# Patient Record
Sex: FEMALE
[De-identification: names, ages, dates, MRNs, and addresses within clinical notes are randomized per-mention and may not be internally consistent; named-entity substitution may affect disease eponyms.]

---

## 2022-02-07 ENCOUNTER — APPOINTMENT (OUTPATIENT)
Age: 67
End: 2022-02-07

## 2022-05-19 DIAGNOSIS — M25.562 PAIN IN LEFT KNEE: ICD-10-CM

## 2022-05-20 ENCOUNTER — NON-APPOINTMENT (OUTPATIENT)
Age: 67
End: 2022-05-20

## 2022-05-20 ENCOUNTER — APPOINTMENT (OUTPATIENT)
Dept: ORTHOPEDIC SURGERY | Facility: CLINIC | Age: 67
End: 2022-05-20
Payer: COMMERCIAL

## 2022-05-20 VITALS — HEIGHT: 62 IN | BODY MASS INDEX: 40.48 KG/M2 | WEIGHT: 220 LBS

## 2022-05-20 DIAGNOSIS — M21.162 VARUS DEFORMITY, NOT ELSEWHERE CLASSIFIED, LEFT KNEE: ICD-10-CM

## 2022-05-20 PROCEDURE — 99204 OFFICE O/P NEW MOD 45 MIN: CPT

## 2022-05-20 PROCEDURE — 73564 X-RAY EXAM KNEE 4 OR MORE: CPT | Mod: LT

## 2022-05-20 NOTE — ADDENDUM
[FreeTextEntry1] : This note was written by Elle Burkett on 05/20/2022 acting as scribe for Dr. Mekhi Sanchez M.D.\par \par I, Dr. Mekhi Sanchez, have read and attest that all the information, medical decision making and discharge instructions within are true and accurate.

## 2022-05-20 NOTE — PHYSICAL EXAM
[de-identified] : General appearance: well nourished and hydrated, pleasant, alert and oriented x 3, cooperative.\par HEENT: Normocephalic, EOM intact, Nasal septum midline, Oral cavity clear, External auditory canal clear.\par Cardiovascular: no apparent abnormalities, bilateral lower leg edema, no varicosities, pedal pulses are palpable.\par Lymphatics Lymph nodes: none palpated, Lymphedema: not present.\par Neurologic: sensation is normal, no muscle weakness in upper or lower extremities, patella tendon reflexes intact .\par Dermatologic no apparent skin lesions, moist, warm, no rash.\par Spine:cervical spine appears normal and moves freely, thoracic spine appears normal and moves freely, lumbar lordosis\par Gait: nonantalgic, lateral thrust\par \par Left knee\par Inspection: trace effusion, no erythema.\par Wounds: none.\par Alignment: 10 degrees varus\par Palpation: no specific tenderness on palpation.\par ROM active (in degrees): 0-90 with pain and crepitus\par Ligamentous laxity: all ligaments appear stable,, negative ant. drawer test, negative post. drawer test, stable to varus stress test, stable to valgus stress test. negative Lachman's test, negative pivot shift test\par Meniscal Test: negative McMurrays, negative Luz.\par Patellofemoral Alignment Test: Q angle-, normal.\par Muscle Test: good quad strength.\par \par Right knee\par Inspection: no effusion or erythema.\par Wounds: none.\par Alignment: varus\par Palpation: no specific tenderness on palpation.\par ROM active (in degrees): 0-90\par Ligamentous laxity: all ligaments appear stable,, negative ant. drawer test, negative post. drawer test, stable to varus stress test, stable to valgus stress test. negative Lachman's test, negative pivot shift test\par Meniscal Test: negative McMurrays, negative Luz.\par Patellofemoral Alignment Test: Q angle-, normal.\par Muscle Test: good quad strength.\par \par Left hip\par Inspection: No swelling or ecchymosis.\par Wounds: none.\par Palpation: non-tender.\par Stability: no instability.\par Strength: 5/5 all motor groups.\par ROM: no pain with FROM.\par Leg length: equal.\par \par Right hip\par Inspection: No swelling or ecchymosis.\par Wounds: none.\par Palpation: non-tender.\par Stability: no instability.\par Strength: 5/5 all motor groups.\par ROM: no pain with FROM.\par Leg length: equal.\par \par Left ankle\par Inspection: no erythema noted, no swelling noted.\par Palpation: no pain on palpation .\par ROM: FROM without crepitus.\par Muscle strength: 5/5.\par Stability: no instability noted.\par \par Right ankle\par Inspection: no erythema noted, no swelling noted.\par ROM: FROM without crepitus.\par Palpation: no pain on palpation .\par Muscle strength: 5/5.\par Stability: no instability noted.\par \par Left foot\par Inspection: color, texture and turgor are normal.\par ROM: full range of motion of all joints without pain or crepitus.\par Palpation: no tenderness.\par Stability: no instability noted.\par \par Right foot\par Inspection: color, texture and turgor are normal.\par ROM: full range of motion of all joints without pain or crepitus.\par Palpation: no tenderness.\par Stability: no instability noted.\par \par Left shoulder\par Inspection: no muscle asymmetry, no atrophy.\par Palpation: no tenderness noted, ACJ non-tender.\par ROM: full active ROM, full passive ROM.\par Strength testing): anterior deltoid, supraspinatus, infraspinatus, subscapularis all 5/5.\par Stability test: ant. apprehension negative, post. apprehension negative, relocation test negative.\par Impingement Test: negative NEER.\par \par Right shoulder\par Inspection: no muscle asymmetry, no atrophy.\par Palpation: no tenderness noted, ACJ non-tender.\par ROM: full active ROM, full passive ROM.\par Strength testing): anterior deltoid, supraspinatus, infraspinatus, subscapularis all 5/5.\par Stability test: ant. apprehension negative, post. apprehension negative, relocation test negative.\par Impingement Test: negative NEER.\par Surgical Wounds: none.\par \par Left elbow\par Inspection: negative swelling.\par Wounds: none.\par Palpation: non-tender.\par ROM: full ROM.\par Strength: 5/5 all groups.\par Stability: no instability.\par Mass: none.\par \par Right elbow\par Inspection: negative swelling.\par Wounds: none.\par Palpation: non-tender.\par ROM: full ROM.\par Strength: 5/5 all groups.\par Stability: no instability.\par Mass: none.\par \par Left wrist\par Inspection: negative swelling.\par Wound: none.\par Palpation (bone): no tenderness.\par ROM: full ROM.\par Strength: full , good.\par \par Right wrist\par Inspection: negative swelling.\par Wound: none.\par Palpation (bone): no tenderness.\par ROM: full ROM.\par Strength: full , good.\par \par Left hand\par Inspection: no skin changes, normal appearance.\par Wounds: none.\par Strength: full , able to make full fist.\par Sensation: light touch intact all fingers and thumb.\par Vascular: good capillary refill < 3 seconds, all fingers and thumb.\par Mass: none.\par \par Right hand\par Inspection: no skin changes, normal appearance.\par Wounds: none.\par Strength: full , able to make full fist.\par Sensation: light touch intact all fingers and thumb.\par Vascular: good capillary refill < 3 seconds, all fingers and thumb.\par Mass: none. [de-identified] : Left knee xrays, standing AP/Lateral and Merchant films, and 45 degree PA standing view, taken at the office today shows varus deformity diffuse tricompartmental degenerative arthritis, medial joint space narrowing, significant marginal osteophytes, bone on bone with sclerosis, patellofemoral joint space narrowing, peripheral osteophytes, loose bodies Kellgren and Javier grade 4\par \par Right knee xray merchant view taken at the office today demonstrates joint space narrowing, marginal osteophytes, sclerosis consistent with severe patellofemoral arthritis.

## 2022-05-20 NOTE — HISTORY OF PRESENT ILLNESS
[de-identified] : SHIRLENE SORIANO is a 66 year old female who presents for initial evaluation of left knee pain for many years. Denies any injury. Pain is medial, anterior with associated clicking. Reports that she walks about 1 block and uses cane for ambulation. H/o Cortisone injection in Sept 2021 and PT in 2020 which provided minimal relief. Also takes Tylenol 500mg daily. PMHx - Afib and is on Xarelto.

## 2022-05-20 NOTE — DISCUSSION/SUMMARY
[de-identified] : Discussed at length the natural history of LEFT knee degenerative arthritis and reviewed non-operative and operative treatment. Due to the pain, failure of prior nonoperative treatment including injections, NSAIDs, and physiotherapy, and associated disability I recommend a LEFT total knee replacement. The risks, benefits, convalescence and alternatives were reviewed. Numerous questions were asked and answered. We did discuss implant choice and fixation, with shared decision making with the patient. Models were used as an educational tool. Surgery will be scheduled at a convenient time. \par \par Preop medical and cardiac clearance.\par \par Also discussed limited ROM and her weight. She will need to come off of Xarelto preoperatively.

## 2022-07-26 ENCOUNTER — OUTPATIENT (OUTPATIENT)
Dept: OUTPATIENT SERVICES | Facility: HOSPITAL | Age: 67
LOS: 1 days | Discharge: HOME | End: 2022-07-26

## 2022-07-26 VITALS
HEIGHT: 62 IN | OXYGEN SATURATION: 99 % | HEART RATE: 65 BPM | RESPIRATION RATE: 17 BRPM | WEIGHT: 216.93 LBS | TEMPERATURE: 97 F | DIASTOLIC BLOOD PRESSURE: 70 MMHG | SYSTOLIC BLOOD PRESSURE: 124 MMHG

## 2022-07-26 DIAGNOSIS — E78.5 HYPERLIPIDEMIA, UNSPECIFIED: ICD-10-CM

## 2022-07-26 DIAGNOSIS — I10 ESSENTIAL (PRIMARY) HYPERTENSION: ICD-10-CM

## 2022-07-26 DIAGNOSIS — Z01.818 ENCOUNTER FOR OTHER PREPROCEDURAL EXAMINATION: ICD-10-CM

## 2022-07-26 DIAGNOSIS — M17.12 UNILATERAL PRIMARY OSTEOARTHRITIS, LEFT KNEE: ICD-10-CM

## 2022-07-26 DIAGNOSIS — I48.91 UNSPECIFIED ATRIAL FIBRILLATION: ICD-10-CM

## 2022-07-26 LAB
ALBUMIN SERPL ELPH-MCNC: 3.7 G/DL — SIGNIFICANT CHANGE UP (ref 3.3–5)
ALP SERPL-CCNC: 134 U/L — HIGH (ref 40–120)
ALT FLD-CCNC: 18 U/L — SIGNIFICANT CHANGE UP (ref 12–78)
ANION GAP SERPL CALC-SCNC: 8 MMOL/L — SIGNIFICANT CHANGE UP (ref 5–17)
APPEARANCE UR: CLEAR — SIGNIFICANT CHANGE UP
APTT BLD: 39.4 SEC — HIGH (ref 27.5–35.5)
AST SERPL-CCNC: 15 U/L — SIGNIFICANT CHANGE UP (ref 15–37)
BASOPHILS # BLD AUTO: 0.02 K/UL — SIGNIFICANT CHANGE UP (ref 0–0.2)
BASOPHILS NFR BLD AUTO: 0.4 % — SIGNIFICANT CHANGE UP (ref 0–2)
BILIRUB SERPL-MCNC: 0.6 MG/DL — SIGNIFICANT CHANGE UP (ref 0.2–1.2)
BILIRUB UR-MCNC: NEGATIVE — SIGNIFICANT CHANGE UP
BLD GP AB SCN SERPL QL: SIGNIFICANT CHANGE UP
BUN SERPL-MCNC: 12 MG/DL — SIGNIFICANT CHANGE UP (ref 7–23)
CALCIUM SERPL-MCNC: 9.3 MG/DL — SIGNIFICANT CHANGE UP (ref 8.5–10.1)
CHLORIDE SERPL-SCNC: 108 MMOL/L — SIGNIFICANT CHANGE UP (ref 96–108)
CO2 SERPL-SCNC: 27 MMOL/L — SIGNIFICANT CHANGE UP (ref 22–31)
COLOR SPEC: YELLOW — SIGNIFICANT CHANGE UP
CREAT SERPL-MCNC: 0.65 MG/DL — SIGNIFICANT CHANGE UP (ref 0.5–1.3)
DIFF PNL FLD: ABNORMAL
EGFR: 98 ML/MIN/1.73M2 — SIGNIFICANT CHANGE UP
EOSINOPHIL # BLD AUTO: 0.07 K/UL — SIGNIFICANT CHANGE UP (ref 0–0.5)
EOSINOPHIL NFR BLD AUTO: 1.3 % — SIGNIFICANT CHANGE UP (ref 0–6)
GLUCOSE SERPL-MCNC: 83 MG/DL — SIGNIFICANT CHANGE UP (ref 70–99)
GLUCOSE UR QL: NEGATIVE MG/DL — SIGNIFICANT CHANGE UP
HCT VFR BLD CALC: 38.3 % — SIGNIFICANT CHANGE UP (ref 34.5–45)
HGB BLD-MCNC: 12.7 G/DL — SIGNIFICANT CHANGE UP (ref 11.5–15.5)
IMM GRANULOCYTES NFR BLD AUTO: 0.4 % — SIGNIFICANT CHANGE UP (ref 0–1.5)
INR BLD: 2.07 RATIO — HIGH (ref 0.88–1.16)
KETONES UR-MCNC: ABNORMAL
LEUKOCYTE ESTERASE UR-ACNC: NEGATIVE — SIGNIFICANT CHANGE UP
LYMPHOCYTES # BLD AUTO: 2.27 K/UL — SIGNIFICANT CHANGE UP (ref 1–3.3)
LYMPHOCYTES # BLD AUTO: 41 % — SIGNIFICANT CHANGE UP (ref 13–44)
MCHC RBC-ENTMCNC: 29.5 PG — SIGNIFICANT CHANGE UP (ref 27–34)
MCHC RBC-ENTMCNC: 33.2 G/DL — SIGNIFICANT CHANGE UP (ref 32–36)
MCV RBC AUTO: 88.9 FL — SIGNIFICANT CHANGE UP (ref 80–100)
MONOCYTES # BLD AUTO: 0.39 K/UL — SIGNIFICANT CHANGE UP (ref 0–0.9)
MONOCYTES NFR BLD AUTO: 7 % — SIGNIFICANT CHANGE UP (ref 2–14)
NEUTROPHILS # BLD AUTO: 2.77 K/UL — SIGNIFICANT CHANGE UP (ref 1.8–7.4)
NEUTROPHILS NFR BLD AUTO: 49.9 % — SIGNIFICANT CHANGE UP (ref 43–77)
NITRITE UR-MCNC: NEGATIVE — SIGNIFICANT CHANGE UP
NRBC # BLD: 0 /100 WBCS — SIGNIFICANT CHANGE UP (ref 0–0)
PH UR: 5 — SIGNIFICANT CHANGE UP (ref 5–8)
PLATELET # BLD AUTO: 234 K/UL — SIGNIFICANT CHANGE UP (ref 150–400)
POTASSIUM SERPL-MCNC: 3.1 MMOL/L — LOW (ref 3.5–5.3)
POTASSIUM SERPL-SCNC: 3.1 MMOL/L — LOW (ref 3.5–5.3)
PROT SERPL-MCNC: 7.9 GM/DL — SIGNIFICANT CHANGE UP (ref 6–8.3)
PROT UR-MCNC: 15 MG/DL
PROTHROM AB SERPL-ACNC: 24.8 SEC — HIGH (ref 10.5–13.4)
RBC # BLD: 4.31 M/UL — SIGNIFICANT CHANGE UP (ref 3.8–5.2)
RBC # FLD: 13.1 % — SIGNIFICANT CHANGE UP (ref 10.3–14.5)
RBC CASTS # UR COMP ASSIST: SIGNIFICANT CHANGE UP /HPF (ref 0–4)
SODIUM SERPL-SCNC: 143 MMOL/L — SIGNIFICANT CHANGE UP (ref 135–145)
SP GR SPEC: 1.02 — SIGNIFICANT CHANGE UP (ref 1.01–1.02)
UROBILINOGEN FLD QL: 1 MG/DL
WBC # BLD: 5.54 K/UL — SIGNIFICANT CHANGE UP (ref 3.8–10.5)
WBC # FLD AUTO: 5.54 K/UL — SIGNIFICANT CHANGE UP (ref 3.8–10.5)
WBC UR QL: NEGATIVE — SIGNIFICANT CHANGE UP

## 2022-07-26 PROCEDURE — 93010 ELECTROCARDIOGRAM REPORT: CPT

## 2022-07-26 NOTE — PHYSICAL THERAPY INITIAL EVALUATION ADULT - IMPAIRMENTS FOUND, PT EVAL
gait, locomotion, and balance/muscle strength/ROM aerobic capacity/endurance/gait, locomotion, and balance/muscle strength/ROM

## 2022-07-26 NOTE — PHYSICAL THERAPY INITIAL EVALUATION ADULT - LIVES WITH, PROFILE
Pt lives with son with TBI and has HHA for the son, states that she has friends that will be able to assist her after surgery. Pt lives in private house with 3 steps to enter with L-HR up. Once inside, there is a flight of 13 steps with R-HR to the second floor, where the bedroom and one full bathroom is. Per pt, her uncle will be installing B/L HR for the flight of stairs.

## 2022-07-26 NOTE — H&P PST ADULT - PROBLEM SELECTOR PLAN 2
labs - cbc,pt/ptt,bmp,t&s,nose cx,ekg  Medical and cardiac clearance required  preop 3 day Hibiclens instruction reviewed and given .instructed on if  nose cx positive use Mupirocin 5 days and checklist given  take routine meds DOS with sips of water. avoid NSAID and OTC supplements. verbalized understanding  information on proper nutrition , increase protein and better food choices provided in packet   ensure clear provided  pt aware covid swab is required 3-5 days prior to surgery  anesthesiologist to review PST labs, EKG, medical and cardiac clearance and optimization for surgery

## 2022-07-26 NOTE — PHYSICAL THERAPY INITIAL EVALUATION ADULT - ADDITIONAL COMMENTS
Full bathroom on second floor has tub-shower combination, no grab bars, retractable shower head, and comfort height toilet seat. Half bathroom on first floor with standard height toilet seat. Pt states both bathrooms are able to fit a 3:1 commode. Pt is currently independent with ambulation and all ADLs. She has own cane. Pt reports pain at rest is 0/10 and at worst is 10/10, that is aggravated with walking and alleviated with ice/heat. She states that she has taken Tylenol and Baclofen for the pain. Reports no allergies to medications. Not currently doing outpatient PT. Reports no recent falls or buckling. R-handed, wears glasses for distance, retired and currently drives.

## 2022-07-26 NOTE — H&P PST ADULT - ASSESSMENT
66 year old female with a past medical history of HTN, AfIb ( on Xarelto), and HLD c/o pain and limited ROM to left knee secondary to osteoarthritis  She is scheduled for a left total knee replacement on 2022.    CAPRINI SCORE [CLOT]    AGE RELATED RISK FACTORS                                                       MOBILITY RELATED FACTORS  [ ] Age 41-60 years                                            (1 Point)                  [ ] Bed rest                                                        (1 Point)  [x ] Age: 61-74 years                                           (2 Points)                 [ ] Plaster cast                                                   (2 Points)  [ ] Age= 75 years                                              (3 Points)                 [ ] Bed bound for more than 72 hours                 (2 Points)    DISEASE RELATED RISK FACTORS                                               GENDER SPECIFIC FACTORS  [ ] Edema in the lower extremities                       (1 Point)                  [ ] Pregnancy                                                     (1 Point)  [ ] Varicose veins                                               (1 Point)                  [ ] Post-partum < 6 weeks                                   (1 Point)             [x] BMI > 25 Kg/m2                                            (1 Point)                  [ ] Hormonal therapy  or oral contraception          (1 Point)                 [ ] Sepsis (in the previous month)                        (1 Point)                  [ ] History of pregnancy complications                 (1 point)  [ ] Pneumonia or serious lung disease                                               [ ] Unexplained or recurrent                     (1 Point)           (in the previous month)                               (1 Point)  [ ] Abnormal pulmonary function test                     (1 Point)                 SURGERY RELATED RISK FACTORS  [ ] Acute myocardial infarction                              (1 Point)                 [ ]  Section                                             (1 Point)  [ ] Congestive heart failure (in the previous month)  (1 Point)               [ ] Minor surgery                                                  (1 Point)   [ ] Inflammatory bowel disease                             (1 Point)                 [ ] Arthroscopic surgery                                        (2 Points)  [ ] Central venous access                                      (2 Points)                [ ] General surgery lasting more than 45 minutes   (2 Points)       [ ] Stroke (in the previous month)                          (5 Points)               [x ] Elective arthroplasty                                         (5 Points)                                                                                                                                               HEMATOLOGY RELATED FACTORS                                                 TRAUMA RELATED RISK FACTORS  [ ] Prior episodes of VTE                                     (3 Points)                [ ] Fracture of the hip, pelvis, or leg                       (5 Points)  [ ] Positive family history for VTE                         (3 Points)                 [ ] Acute spinal cord injury (in the previous month)  (5 Points)  [ ] Prothrombin 78734 A                                     (3 Points)                 [ ] Paralysis  (less than 1 month)                             (5 Points)  [ ] Factor V Leiden                                             (3 Points)                  [ ] Multiple Trauma within 1 month                        (5 Points)  [ ] Lupus anticoagulants                                     (3 Points)                                                           [ ] Anticardiolipin antibodies                               (3 Points)                                                       [ ] High homocysteine in the blood                      (3 Points)                                             [ ] Other congenital or acquired thrombophilia      (3 Points)                                                [ ] Heparin induced thrombocytopenia                  (3 Points)                                          Total Score [     8    Caprini Score 0 - 2:  Low Risk, No VTE Prophylaxis required for most patients, encourage ambulation  Caprini Score 3 - 6:  At Risk, pharmacologic VTE prophylaxis is indicated for most patients (in the absence of a contraindication)  Caprini Score Greater than or = 7:  High Risk, pharmacologic VTE prophylaxis is indicated for most patients (in the absence of a contraindication)    Caprini score indicates that the patient is high risk for VTE event ( score 6 or greater). Surgical patient's in this group will benefit from both pharmacologic prophylaxis and intermittent compression devices . Surgical team will determine the balance between VTE  risk and bleeding risk and other clinical considerations

## 2022-07-26 NOTE — PHYSICAL THERAPY INITIAL EVALUATION ADULT - 30 SECOND CHAIR STAND TEST, REHAB EVAL
30 second Sit to Stand Test: (reps: 14) Functional assessment of lower extremity strength and ability to maintain balance in standing, discriminates those with low and high levels of functional activity.

## 2022-07-26 NOTE — STUDENT SIGN OFF DOCUMENT - DOCUMENTS STUDENTS ARE SIGNED OFF ON
Physical Therapy Evaluation/Discharge Plan Plan of Care/Assessment and Intervention/Constant Observation

## 2022-07-26 NOTE — H&P PST ADULT - HISTORY OF PRESENT ILLNESS
66 year old female with a past medical history of HTN, AfIb ( on Xarelto), and HLD c/o pain and limited ROM to left knee secondary to osteoarthritis  She is scheduled for a left total knee replacement on 8/18/2022.    She denies fever cough, SOB, recent travels and sick contacts.    Goal: to walk without pain

## 2022-07-26 NOTE — PHYSICAL THERAPY INITIAL EVALUATION ADULT - PATIENT/FAMILY/SIGNIFICANT OTHER GOALS STATEMENT, PT EVAL
Pt states that she would like to move with no pain Pt states that she would like to ambulate with no pain.

## 2022-07-26 NOTE — PHYSICAL THERAPY INITIAL EVALUATION ADULT - GENERAL OBSERVATIONS, REHAB EVAL
Patient seen seated in recliner chair in [facility] waiting area with no apparent distress. Bilateral genu varum noted.

## 2022-07-26 NOTE — PHYSICAL THERAPY INITIAL EVALUATION ADULT - SINGLE LEG BALANCE TEST, REHAB EVAL
One Leg Stand Test (OLST): Right: 4sec Left: unable to perform Functional test to assess fall risk. Both gait and stair negotiation require components of OLST. Participants unable to perform the OLST for at least 5 seconds are at increased risk for injurious fall.

## 2022-07-26 NOTE — PHYSICAL THERAPY INITIAL EVALUATION ADULT - ASR EQUIP NEEDS DISCH PT EVAL
3:1 commode/rolling walker (5 inch wheels) pt has own cane/3:1 commode/rolling walker (5 inch wheels)

## 2022-07-27 LAB
A1C WITH ESTIMATED AVERAGE GLUCOSE RESULT: 5.7 % — HIGH (ref 4–5.6)
CULTURE RESULTS: SIGNIFICANT CHANGE UP
ESTIMATED AVERAGE GLUCOSE: 117 MG/DL — HIGH (ref 68–114)
MRSA PCR RESULT.: SIGNIFICANT CHANGE UP
S AUREUS DNA NOSE QL NAA+PROBE: SIGNIFICANT CHANGE UP
SPECIMEN SOURCE: SIGNIFICANT CHANGE UP
VIT D25+D1,25 OH+D1,25 PNL SERPL-MCNC: 57.6 PG/ML — SIGNIFICANT CHANGE UP (ref 19.9–79.3)

## 2022-08-09 RX ORDER — PANTOPRAZOLE SODIUM 20 MG/1
40 TABLET, DELAYED RELEASE ORAL
Refills: 0 | Status: DISCONTINUED | OUTPATIENT
Start: 2022-08-18 | End: 2022-08-19

## 2022-08-09 RX ORDER — CELECOXIB 200 MG/1
200 CAPSULE ORAL EVERY 12 HOURS
Refills: 0 | Status: DISCONTINUED | OUTPATIENT
Start: 2022-08-19 | End: 2022-08-19

## 2022-08-09 RX ORDER — ACETAMINOPHEN 500 MG
975 TABLET ORAL EVERY 8 HOURS
Refills: 0 | Status: DISCONTINUED | OUTPATIENT
Start: 2022-08-18 | End: 2022-08-19

## 2022-08-09 RX ORDER — HYDROCHLOROTHIAZIDE 25 MG
25 TABLET ORAL DAILY
Refills: 0 | Status: DISCONTINUED | OUTPATIENT
Start: 2022-08-18 | End: 2022-08-19

## 2022-08-09 RX ORDER — SENNA PLUS 8.6 MG/1
2 TABLET ORAL AT BEDTIME
Refills: 0 | Status: DISCONTINUED | OUTPATIENT
Start: 2022-08-18 | End: 2022-08-19

## 2022-08-09 RX ORDER — OXYCODONE HYDROCHLORIDE 5 MG/1
5 TABLET ORAL
Refills: 0 | Status: DISCONTINUED | OUTPATIENT
Start: 2022-08-18 | End: 2022-08-19

## 2022-08-09 RX ORDER — METOPROLOL TARTRATE 50 MG
100 TABLET ORAL DAILY
Refills: 0 | Status: DISCONTINUED | OUTPATIENT
Start: 2022-08-18 | End: 2022-08-19

## 2022-08-09 RX ORDER — HYDROMORPHONE HYDROCHLORIDE 2 MG/ML
0.5 INJECTION INTRAMUSCULAR; INTRAVENOUS; SUBCUTANEOUS ONCE
Refills: 0 | Status: DISCONTINUED | OUTPATIENT
Start: 2022-08-18 | End: 2022-08-19

## 2022-08-09 RX ORDER — AMLODIPINE BESYLATE 2.5 MG/1
5 TABLET ORAL DAILY
Refills: 0 | Status: DISCONTINUED | OUTPATIENT
Start: 2022-08-18 | End: 2022-08-19

## 2022-08-09 RX ORDER — ONDANSETRON 8 MG/1
4 TABLET, FILM COATED ORAL EVERY 6 HOURS
Refills: 0 | Status: DISCONTINUED | OUTPATIENT
Start: 2022-08-18 | End: 2022-08-19

## 2022-08-09 RX ORDER — SODIUM CHLORIDE 9 MG/ML
1000 INJECTION, SOLUTION INTRAVENOUS
Refills: 0 | Status: DISCONTINUED | OUTPATIENT
Start: 2022-08-18 | End: 2022-08-19

## 2022-08-09 RX ORDER — BACLOFEN 100 %
10 POWDER (GRAM) MISCELLANEOUS EVERY 8 HOURS
Refills: 0 | Status: DISCONTINUED | OUTPATIENT
Start: 2022-08-18 | End: 2022-08-19

## 2022-08-09 RX ORDER — OXYCODONE HYDROCHLORIDE 5 MG/1
10 TABLET ORAL
Refills: 0 | Status: DISCONTINUED | OUTPATIENT
Start: 2022-08-18 | End: 2022-08-19

## 2022-08-09 RX ORDER — TRAMADOL HYDROCHLORIDE 50 MG/1
50 TABLET ORAL EVERY 6 HOURS
Refills: 0 | Status: DISCONTINUED | OUTPATIENT
Start: 2022-08-18 | End: 2022-08-19

## 2022-08-09 RX ORDER — MAGNESIUM HYDROXIDE 400 MG/1
30 TABLET, CHEWABLE ORAL DAILY
Refills: 0 | Status: DISCONTINUED | OUTPATIENT
Start: 2022-08-18 | End: 2022-08-19

## 2022-08-09 RX ORDER — POLYETHYLENE GLYCOL 3350 17 G/17G
17 POWDER, FOR SOLUTION ORAL AT BEDTIME
Refills: 0 | Status: DISCONTINUED | OUTPATIENT
Start: 2022-08-18 | End: 2022-08-19

## 2022-08-09 RX ORDER — VALSARTAN 80 MG/1
320 TABLET ORAL DAILY
Refills: 0 | Status: DISCONTINUED | OUTPATIENT
Start: 2022-08-18 | End: 2022-08-19

## 2022-08-09 RX ORDER — ATORVASTATIN CALCIUM 80 MG/1
20 TABLET, FILM COATED ORAL AT BEDTIME
Refills: 0 | Status: DISCONTINUED | OUTPATIENT
Start: 2022-08-18 | End: 2022-08-19

## 2022-08-09 RX ORDER — FOLIC ACID 0.8 MG
1 TABLET ORAL DAILY
Refills: 0 | Status: DISCONTINUED | OUTPATIENT
Start: 2022-08-18 | End: 2022-08-19

## 2022-08-11 RX ORDER — RIVAROXABAN 15 MG-20MG
10 KIT ORAL DAILY
Refills: 0 | Status: DISCONTINUED | OUTPATIENT
Start: 2022-08-19 | End: 2022-08-19

## 2022-08-18 ENCOUNTER — TRANSCRIPTION ENCOUNTER (OUTPATIENT)
Age: 67
End: 2022-08-18

## 2022-08-18 ENCOUNTER — INPATIENT (INPATIENT)
Facility: HOSPITAL | Age: 67
LOS: 0 days | Discharge: HOME | End: 2022-08-19
Attending: ORTHOPAEDIC SURGERY | Admitting: ORTHOPAEDIC SURGERY

## 2022-08-18 ENCOUNTER — APPOINTMENT (OUTPATIENT)
Dept: ORTHOPEDIC SURGERY | Facility: HOSPITAL | Age: 67
End: 2022-08-18

## 2022-08-18 VITALS
DIASTOLIC BLOOD PRESSURE: 84 MMHG | WEIGHT: 216.05 LBS | HEART RATE: 69 BPM | TEMPERATURE: 98 F | OXYGEN SATURATION: 100 % | SYSTOLIC BLOOD PRESSURE: 124 MMHG | RESPIRATION RATE: 17 BRPM | HEIGHT: 62 IN

## 2022-08-18 DIAGNOSIS — M17.12 UNILATERAL PRIMARY OSTEOARTHRITIS, LEFT KNEE: ICD-10-CM

## 2022-08-18 LAB
ANION GAP SERPL CALC-SCNC: 7 MMOL/L — SIGNIFICANT CHANGE UP (ref 5–17)
APTT BLD: 29.8 SEC — SIGNIFICANT CHANGE UP (ref 27.5–35.5)
BUN SERPL-MCNC: 15 MG/DL — SIGNIFICANT CHANGE UP (ref 7–23)
CALCIUM SERPL-MCNC: 8.9 MG/DL — SIGNIFICANT CHANGE UP (ref 8.5–10.1)
CHLORIDE SERPL-SCNC: 110 MMOL/L — HIGH (ref 96–108)
CO2 SERPL-SCNC: 23 MMOL/L — SIGNIFICANT CHANGE UP (ref 22–31)
CREAT SERPL-MCNC: 0.66 MG/DL — SIGNIFICANT CHANGE UP (ref 0.5–1.3)
EGFR: 98 ML/MIN/1.73M2 — SIGNIFICANT CHANGE UP
GLUCOSE SERPL-MCNC: 147 MG/DL — HIGH (ref 70–99)
HCT VFR BLD CALC: 35.8 % — SIGNIFICANT CHANGE UP (ref 34.5–45)
HGB BLD-MCNC: 11.6 G/DL — SIGNIFICANT CHANGE UP (ref 11.5–15.5)
INR BLD: 1.1 RATIO — SIGNIFICANT CHANGE UP (ref 0.88–1.16)
MCHC RBC-ENTMCNC: 29.6 PG — SIGNIFICANT CHANGE UP (ref 27–34)
MCHC RBC-ENTMCNC: 32.4 G/DL — SIGNIFICANT CHANGE UP (ref 32–36)
MCV RBC AUTO: 91.3 FL — SIGNIFICANT CHANGE UP (ref 80–100)
NRBC # BLD: 0 /100 WBCS — SIGNIFICANT CHANGE UP (ref 0–0)
PLATELET # BLD AUTO: 209 K/UL — SIGNIFICANT CHANGE UP (ref 150–400)
POTASSIUM SERPL-MCNC: 3.5 MMOL/L — SIGNIFICANT CHANGE UP (ref 3.5–5.3)
POTASSIUM SERPL-MCNC: 3.5 MMOL/L — SIGNIFICANT CHANGE UP (ref 3.5–5.3)
POTASSIUM SERPL-SCNC: 3.5 MMOL/L — SIGNIFICANT CHANGE UP (ref 3.5–5.3)
POTASSIUM SERPL-SCNC: 3.5 MMOL/L — SIGNIFICANT CHANGE UP (ref 3.5–5.3)
PROTHROM AB SERPL-ACNC: 13.1 SEC — SIGNIFICANT CHANGE UP (ref 10.5–13.4)
RBC # BLD: 3.92 M/UL — SIGNIFICANT CHANGE UP (ref 3.8–5.2)
RBC # FLD: 13.3 % — SIGNIFICANT CHANGE UP (ref 10.3–14.5)
SARS-COV-2 N GENE NPH QL NAA+PROBE: NOT DETECTED
SODIUM SERPL-SCNC: 140 MMOL/L — SIGNIFICANT CHANGE UP (ref 135–145)
WBC # BLD: 5.91 K/UL — SIGNIFICANT CHANGE UP (ref 3.8–10.5)
WBC # FLD AUTO: 5.91 K/UL — SIGNIFICANT CHANGE UP (ref 3.8–10.5)

## 2022-08-18 PROCEDURE — 27447 TOTAL KNEE ARTHROPLASTY: CPT | Mod: LT

## 2022-08-18 DEVICE — TIB PSN NP STM 5 DEG SZ DL: Type: IMPLANTABLE DEVICE | Site: LEFT | Status: FUNCTIONAL

## 2022-08-18 DEVICE — FEM PERSONA PS CMT CCR NRW L SZ 7: Type: IMPLANTABLE DEVICE | Site: LEFT | Status: FUNCTIONAL

## 2022-08-18 DEVICE — SURF ART PERSONA LT 6-9 CD 13MM: Type: IMPLANTABLE DEVICE | Site: LEFT | Status: FUNCTIONAL

## 2022-08-18 DEVICE — CEMENT PALACOS R: Type: IMPLANTABLE DEVICE | Site: LEFT | Status: FUNCTIONAL

## 2022-08-18 DEVICE — STEM EXT PERSONA 14MM PLUS 30M: Type: IMPLANTABLE DEVICE | Site: LEFT | Status: FUNCTIONAL

## 2022-08-18 DEVICE — ZIMMER/NEXGEN SMOOTH PIN 3.2X75MM: Type: IMPLANTABLE DEVICE | Site: LEFT | Status: FUNCTIONAL

## 2022-08-18 DEVICE — PATELLA  ALL POLY VE 32MM: Type: IMPLANTABLE DEVICE | Site: LEFT | Status: FUNCTIONAL

## 2022-08-18 DEVICE — ZIMMER/NEXGEN HEX HEAD SCREW 3.5MM: Type: IMPLANTABLE DEVICE | Site: LEFT | Status: FUNCTIONAL

## 2022-08-18 DEVICE — ZIMMER FEMALE HEX SCREW MAGNETIC 2.5MM X 25MM: Type: IMPLANTABLE DEVICE | Site: LEFT | Status: FUNCTIONAL

## 2022-08-18 RX ORDER — SODIUM CHLORIDE 9 MG/ML
3 INJECTION INTRAMUSCULAR; INTRAVENOUS; SUBCUTANEOUS EVERY 8 HOURS
Refills: 0 | Status: DISCONTINUED | OUTPATIENT
Start: 2022-08-18 | End: 2022-08-18

## 2022-08-18 RX ORDER — CELECOXIB 200 MG/1
200 CAPSULE ORAL ONCE
Refills: 0 | Status: COMPLETED | OUTPATIENT
Start: 2022-08-18 | End: 2022-08-18

## 2022-08-18 RX ORDER — CEFAZOLIN SODIUM 1 G
2000 VIAL (EA) INJECTION EVERY 8 HOURS
Refills: 0 | Status: COMPLETED | OUTPATIENT
Start: 2022-08-18 | End: 2022-08-19

## 2022-08-18 RX ORDER — ATORVASTATIN CALCIUM 80 MG/1
1 TABLET, FILM COATED ORAL
Qty: 0 | Refills: 0 | DISCHARGE

## 2022-08-18 RX ORDER — ACETAMINOPHEN 500 MG
1000 TABLET ORAL ONCE
Refills: 0 | Status: COMPLETED | OUTPATIENT
Start: 2022-08-18 | End: 2022-08-18

## 2022-08-18 RX ORDER — METOPROLOL TARTRATE 50 MG
1 TABLET ORAL
Qty: 0 | Refills: 0 | DISCHARGE

## 2022-08-18 RX ORDER — ACETAMINOPHEN 500 MG
650 TABLET ORAL ONCE
Refills: 0 | Status: COMPLETED | OUTPATIENT
Start: 2022-08-18 | End: 2022-08-18

## 2022-08-18 RX ORDER — SODIUM CHLORIDE 9 MG/ML
1000 INJECTION, SOLUTION INTRAVENOUS
Refills: 0 | Status: DISCONTINUED | OUTPATIENT
Start: 2022-08-18 | End: 2022-08-18

## 2022-08-18 RX ORDER — AMLODIPINE BESYLATE 2.5 MG/1
1 TABLET ORAL
Qty: 0 | Refills: 0 | DISCHARGE

## 2022-08-18 RX ORDER — BACLOFEN 100 %
1 POWDER (GRAM) MISCELLANEOUS
Qty: 0 | Refills: 0 | DISCHARGE

## 2022-08-18 RX ADMIN — CELECOXIB 200 MILLIGRAM(S): 200 CAPSULE ORAL at 09:50

## 2022-08-18 RX ADMIN — SODIUM CHLORIDE 50 MILLILITER(S): 9 INJECTION, SOLUTION INTRAVENOUS at 13:01

## 2022-08-18 RX ADMIN — OXYCODONE HYDROCHLORIDE 5 MILLIGRAM(S): 5 TABLET ORAL at 19:08

## 2022-08-18 RX ADMIN — POLYETHYLENE GLYCOL 3350 17 GRAM(S): 17 POWDER, FOR SOLUTION ORAL at 22:21

## 2022-08-18 RX ADMIN — Medication 100 MILLIGRAM(S): at 18:44

## 2022-08-18 RX ADMIN — ATORVASTATIN CALCIUM 20 MILLIGRAM(S): 80 TABLET, FILM COATED ORAL at 22:22

## 2022-08-18 RX ADMIN — SODIUM CHLORIDE 75 MILLILITER(S): 9 INJECTION, SOLUTION INTRAVENOUS at 15:39

## 2022-08-18 RX ADMIN — SENNA PLUS 2 TABLET(S): 8.6 TABLET ORAL at 22:21

## 2022-08-18 RX ADMIN — Medication 650 MILLIGRAM(S): at 09:50

## 2022-08-18 RX ADMIN — OXYCODONE HYDROCHLORIDE 5 MILLIGRAM(S): 5 TABLET ORAL at 20:08

## 2022-08-18 RX ADMIN — Medication 975 MILLIGRAM(S): at 22:22

## 2022-08-18 RX ADMIN — Medication 975 MILLIGRAM(S): at 23:22

## 2022-08-18 RX ADMIN — Medication 400 MILLIGRAM(S): at 15:39

## 2022-08-18 RX ADMIN — Medication 1000 MILLIGRAM(S): at 16:09

## 2022-08-18 NOTE — DISCHARGE NOTE PROVIDER - CARE PROVIDER_API CALL
Mekhi Sanchez)  Orthopaedic Surgery  [address] [place] [address]  Phone: [phone]  Fax: [phone]  Established Patient  Follow Up Time:

## 2022-08-18 NOTE — PATIENT PROFILE ADULT - FALL HARM RISK - RISK INTERVENTIONS

## 2022-08-18 NOTE — PHYSICAL THERAPY INITIAL EVALUATION ADULT - NS ASR WT BEARING DETAIL LLE
Patient was put on for a ER f/u in office for 7:50 on 7/13/2021 was notified. peter weight-bearing as tolerated

## 2022-08-18 NOTE — PROGRESS NOTE ADULT - SUBJECTIVE AND OBJECTIVE BOX
Postop Check    Patient tolerated the procedure well. Patient seen and examined at bedside.  No acute complaints at this time. Pain well controlled. Denies chest pain, shortness of breath, nausea or vomiting.     Vital Signs Last 24 Hrs  T(C): 36.5 (08-18-22 @ 15:12), Max: 36.7 (08-18-22 @ 09:09)  T(F): 97.7 (08-18-22 @ 15:12), Max: 98 (08-18-22 @ 09:09)  HR: 72 (08-18-22 @ 15:12) (69 - 86)  BP: 112/78 (08-18-22 @ 15:12) (91/48 - 124/84)  BP(mean): --  RR: 16 (08-18-22 @ 15:12) (12 - 17)  SpO2: 98% (08-18-22 @ 15:12) (94% - 100%)                        11.6   5.91  )-----------( 209      ( 18 Aug 2022 13:15 )             35.8     18 Aug 2022 13:15    140    |  110    |  15     ----------------------------<  147    3.5     |  23     |  0.66     Ca    8.9        18 Aug 2022 13:15      PT/INR - ( 18 Aug 2022 08:45 )   PT: 13.1 sec;   INR: 1.10 ratio         PTT - ( 18 Aug 2022 08:45 )  PTT:29.8 sec    General: NAD, resting comfortably in bed  L LE:   HMV in place  Dressing C/D/I  SCDs present bilaterally  Compartments soft and compressible  No calf tenderness bilaterally  +TA/EHL/FHL/GSC  SILT L3-S1  2+ DP/PT    A/P:  66y f s/p L TKA POD 0  -PT/OT   -WBAT  -Pain Control  -DVT ppx   -Continue perioperative abx x 24 hours  -FU AM Labs  -Rest, ice, compress and elevate the extremity as we needed  -Incentive Spirometry  -Medical management appreciated Postop Check    Patient tolerated the procedure well. Patient seen and examined at bedside.  No acute complaints at this time. Pain well controlled. Denies chest pain, shortness of breath, nausea or vomiting.     Vital Signs Last 24 Hrs  T(C): 36.5 (08-18-22 @ 15:12), Max: 36.7 (08-18-22 @ 09:09)  T(F): 97.7 (08-18-22 @ 15:12), Max: 98 (08-18-22 @ 09:09)  HR: 72 (08-18-22 @ 15:12) (69 - 86)  BP: 112/78 (08-18-22 @ 15:12) (91/48 - 124/84)  BP(mean): --  RR: 16 (08-18-22 @ 15:12) (12 - 17)  SpO2: 98% (08-18-22 @ 15:12) (94% - 100%)                        11.6   5.91  )-----------( 209      ( 18 Aug 2022 13:15 )             35.8     18 Aug 2022 13:15    140    |  110    |  15     ----------------------------<  147    3.5     |  23     |  0.66     Ca    8.9        18 Aug 2022 13:15      PT/INR - ( 18 Aug 2022 08:45 )   PT: 13.1 sec;   INR: 1.10 ratio         PTT - ( 18 Aug 2022 08:45 )  PTT:29.8 sec    General: NAD, resting comfortably in bed  L LE:   HMV in place  Dressing C/D/I  SCDs present bilaterally  Compartments soft and compressible  No calf tenderness bilaterally  TA/EHL/FHL/GSC*  L3-S1*  2+ DP  *unable to assess 2/2 block not fully worn off    A/P:  66yF s/p L TKA POD 0  -HMV in place, remove POD1  -PT/OT   -WBAT  -Pain Control  -DVT ppx   -Continue perioperative abx x 24 hours  -FU AM Labs  -Rest, ice, compress and elevate the extremity as we needed  -Incentive Spirometry  -Medical management appreciated  -Dispo: pending PT eval Postop Check    Patient tolerated the procedure well. Patient seen and examined at bedside.  No acute complaints at this time. Pain well controlled. Denies chest pain, shortness of breath, nausea or vomiting.     Vital Signs Last 24 Hrs  T(C): 36.5 (08-18-22 @ 15:12), Max: 36.7 (08-18-22 @ 09:09)  T(F): 97.7 (08-18-22 @ 15:12), Max: 98 (08-18-22 @ 09:09)  HR: 72 (08-18-22 @ 15:12) (69 - 86)  BP: 112/78 (08-18-22 @ 15:12) (91/48 - 124/84)  BP(mean): --  RR: 16 (08-18-22 @ 15:12) (12 - 17)  SpO2: 98% (08-18-22 @ 15:12) (94% - 100%)                        11.6   5.91  )-----------( 209      ( 18 Aug 2022 13:15 )             35.8     18 Aug 2022 13:15    140    |  110    |  15     ----------------------------<  147    3.5     |  23     |  0.66     Ca    8.9        18 Aug 2022 13:15      PT/INR - ( 18 Aug 2022 08:45 )   PT: 13.1 sec;   INR: 1.10 ratio         PTT - ( 18 Aug 2022 08:45 )  PTT:29.8 sec    General: NAD, resting comfortably in bed  L LE:   HMV in place  Dressing C/D/I  SCDs present bilaterally  Compartments soft and compressible  No calf tenderness bilaterally  TA/EHL/FHL/GSC unable to adequately assessed 2/2 spinal/block continued effects  Sensation L3-S1 unable to adequately assessed 2/2 spinal/block continued effects  + DP      A/P:  66yF s/p L TKA POD 0    -will re-evaluate patient for motor/sensory return after affects of spinal/block wear off  -HMV in place, remove POD1  -PT/OT   -WBAT  -Pain Control  -DVT ppx   -Continue perioperative abx x 24 hours  -FU AM Labs  -Rest, ice, compress and elevate the extremity as we needed  -Incentive Spirometry  -Medical management appreciated  -Dispo: pending PT eval Postop Check    Patient tolerated the procedure well. Patient seen and examined at bedside.  No acute complaints at this time. Pain well controlled. Denies chest pain, shortness of breath, nausea or vomiting.     Vital Signs Last 24 Hrs  T(C): 36.5 (08-18-22 @ 15:12), Max: 36.7 (08-18-22 @ 09:09)  T(F): 97.7 (08-18-22 @ 15:12), Max: 98 (08-18-22 @ 09:09)  HR: 72 (08-18-22 @ 15:12) (69 - 86)  BP: 112/78 (08-18-22 @ 15:12) (91/48 - 124/84)  BP(mean): --  RR: 16 (08-18-22 @ 15:12) (12 - 17)  SpO2: 98% (08-18-22 @ 15:12) (94% - 100%)                        11.6   5.91  )-----------( 209      ( 18 Aug 2022 13:15 )             35.8     18 Aug 2022 13:15    140    |  110    |  15     ----------------------------<  147    3.5     |  23     |  0.66     Ca    8.9        18 Aug 2022 13:15      PT/INR - ( 18 Aug 2022 08:45 )   PT: 13.1 sec;   INR: 1.10 ratio         PTT - ( 18 Aug 2022 08:45 )  PTT:29.8 sec    General: NAD, resting comfortably in bed  L LE:   HMV in place  Dressing C/D/I  SCDs present bilaterally  Compartments soft and compressible  No calf tenderness bilaterally  TA/EHL/FHL/GSC unable to adequately assessed 2/2 spinal/block continued effects  Sensation L3-S1 unable to adequately assessed 2/2 spinal/block continued effects  + DP      A/P:  66yF s/p L TKA POD 0    -will re-evaluate patient for motor/sensory return after affects of spinal/block wear off  -HMV in place, remove POD1  -PT/OT   -WBAT  -Pain Control  -DVT ppx Xarelto  -Continue perioperative abx x 24 hours  -FU AM Labs  -Rest, ice, compress and elevate the extremity as we needed  -Incentive Spirometry  -Medical management appreciated  -Dispo: pending PT eval Postop Check    Patient tolerated the procedure well. Patient seen and examined at bedside.  No acute complaints at this time. Pain well controlled. Denies chest pain, shortness of breath, nausea or vomiting.     Vital Signs Last 24 Hrs  T(C): 36.5 (08-18-22 @ 15:12), Max: 36.7 (08-18-22 @ 09:09)  T(F): 97.7 (08-18-22 @ 15:12), Max: 98 (08-18-22 @ 09:09)  HR: 72 (08-18-22 @ 15:12) (69 - 86)  BP: 112/78 (08-18-22 @ 15:12) (91/48 - 124/84)  BP(mean): --  RR: 16 (08-18-22 @ 15:12) (12 - 17)  SpO2: 98% (08-18-22 @ 15:12) (94% - 100%)                        11.6   5.91  )-----------( 209      ( 18 Aug 2022 13:15 )             35.8     18 Aug 2022 13:15    140    |  110    |  15     ----------------------------<  147    3.5     |  23     |  0.66     Ca    8.9        18 Aug 2022 13:15      PT/INR - ( 18 Aug 2022 08:45 )   PT: 13.1 sec;   INR: 1.10 ratio         PTT - ( 18 Aug 2022 08:45 )  PTT:29.8 sec    General: NAD, resting comfortably in bed  L LE:   HMV in place  Dressing C/D/I  SCDs present bilaterally  Compartments soft and compressible  No calf tenderness bilaterally  +TA/EHL/FHL/GSC  SILT L3-S1   + DP      A/P:  66yF s/p L TKA POD 0    -HMV in place, likely remove POD1  -PT/OT   -WBAT  -Pain Control  -DVT ppx Xarelto  -Continue perioperative abx x 24 hours  -FU AM Labs  -Rest, ice, compress and elevate the extremity as we needed  -Incentive Spirometry  -Medical management appreciated  -Dispo: home w/ home PT

## 2022-08-18 NOTE — DISCHARGE NOTE PROVIDER - NSDCCPCAREPLAN_GEN_ALL_CORE_FT
PRINCIPAL DISCHARGE DIAGNOSIS  Diagnosis: S/P total knee arthroplasty, left  Assessment and Plan of Treatment:        PRINCIPAL DISCHARGE DIAGNOSIS  Diagnosis: Osteoarthritis of left knee  Assessment and Plan of Treatment:

## 2022-08-18 NOTE — OCCUPATIONAL THERAPY INITIAL EVALUATION ADULT - RANGE OF MOTION, PT EVAL
Patient will demonstrate increase AROM in left knee to WFL in order to perform toilet transfer independently within 3 weeks

## 2022-08-18 NOTE — DISCHARGE NOTE PROVIDER - HOSPITAL COURSE
3.  Blood clot prevention with Xarelto 10mg daily. Please follow up with your Cardiologist in 1-2 weeks for re-evalution of Xarelto dose. Blood clot prevention with Xarelto 10mg daily. Please follow up with your Cardiologist in 1-2 weeks for re-evalution of Xarelto dose. Hospital Course:  The patient is a 66y Female status post elective left total knee arthroplasty after failing outpatient nonoperative conservative management. Patient presented to [facility] after being medically cleared for an elective surgical procedure. The patient was taken to the operating room on date mentioned above. Prophylactic antibiotics were started before the procedure and continued for 24 hours. There were no complications during the procedure and patient tolerated the procedure well. The patient was transferred to the recovery room in stable condition and subsequently to the surgical floor. The patient was placed on medication for DVT prophylaxis. All home medications were continued. The patient received physical therapy daily and daily labs were followed. The hemovac drain was DC'd on POD #1. The dressing was kept clean, dry, intact. Through POD #1, the hospital stay was unremarkable.     Blood clot prevention with Xarelto 10mg daily. Please follow up with your Cardiologist in 1-2 weeks for re-evalution of Xarelto dose.

## 2022-08-18 NOTE — PHYSICAL THERAPY INITIAL EVALUATION ADULT - RANGE OF MOTION, PT EVAL
Pt will be able to improve ROM of left knee flexion, extension   to WFL  to improve independent in ADL, Gait in 4 weeks

## 2022-08-18 NOTE — OCCUPATIONAL THERAPY INITIAL EVALUATION ADULT - SITTING BALANCE: STATIC
Referred by: Scooter Cannon MD; Medical Diagnosis (from order):    Diagnosis Information      Diagnosis    V43.64 (ICD-9-CM) - Z96.641 (ICD-10-CM) - History of total right hip arthroplasty                Physical Therapy -  Initial Evaluation -  Daily Treatment Note    Visit:  1   Treatment Diagnosis: right: hip symptoms with increased pain/symptoms, impaired posture, impaired range of motion, impaired muscle length/flexibility, impaired balance, impaired gait, increased swelling, impaired tissue mobility, impaired strength  Next referring provider appointment: 11/19/2019    Date of onset: Right hip pain for several years  Date of surgery: 11/4/2019  Chart reviewed at time of initial evaluation (relevant co-morbidities, allergies, tests and medications listed):   Arthritis                                                     Diabetes mellitus (CMS/HCC)                                   GERD (gastroesophageal reflux disease)                        Sleep apnea                                                   Melanoma (CMS/HCC)                                          Current Outpatient Medications:  sildenafil (VIAGRA) 25 MG tablet, TAKE 1 TABLET BY MOUTH AS NEEDED FOR ERECTILE DYSFUNCTION  metformin (GLUCOPHAGE) 1000 MG tablet, Take 1 tablet by mouth 2 times daily (with meals).  dulaglutide (TRULICITY) 0.75 MG/0.5ML pen-injector, Inject 1 mg as directed 1 day a week.  levothyroxine (SYNTHROID, LEVOTHROID) 75 MCG tablet, Take 75 mcg by mouth daily.  fenofibrate 160 MG tablet, Take 1 tablet by mouth daily.  losartan (COZAAR) 25 MG tablet, Take 1 tablet by mouth daily.  dapagliflozin (FARXIGA) 10 MG tablet, Take by mouth daily.  Multiple Vitamins-Minerals (CENTRUM ADULTS) Tab,   omeprazole (PRILOSEC) 20 MG capsule, Take 20 mg by mouth daily.  aspirin 81 MG tablet, Take 81 mg by mouth daily.  aspirin 81 MG chewable tablet,   glimepiride (AMARYL) 4 MG tablet, take one tablet by mouth every day  Tramadol    No current  facility-administered medications for this encounter.     ALLERGIES:   -- Atorvastatin -- HIVES   -- Levothyroxine Sodium -- Other (See Comments)    --  Urticaria              Allergy   -- Pravastatin Sodium -- Other (See Comments)    --  Unknown   -- Statins -- HIVES    SUBJECTIVE                                                                                                             11/6/19 Eval: Patient reports right hip pain for several years. Underwent right anterior TUAN DOS 11/4/19. Pain currently 2-3/10; ranges from 1-7/10. Pain located at anterior right leg. Aggravating factors include: walking, standing, siting, stair climbing. Alleviating factors include: ice, medication, rest. Sleep is disrupted by pain. No imaging has been performed since surgery. Patient works 20-40 hours/week. He also is very active in his [employer].  He is a member of the [employer] and would like to return to his routine.   Functional Change: As a result of right hip post-surgical status, patient has difficulties with staying in one position for short periods of time. Transitioning from sit to stand is also difficult. Stair climbing has been difficult which is significant as he must perform stairs for home mobility. He is using a RW for walking at this time. He was not using an AD previously.    Pain / Symptoms:  Pain/symptom is: constant  Pain rating (out of 10): Current: 3 ; Best: 1; Worst: 7  Location: Pain located at anterior right upper leg/thigh   Quality / Description: ache, throbbing, tight.  Alleviating Factors: avoiding movement in involved area, ice, over-the-counter medication.   Function:   Limitations / Exacerbation Factors: pain, difficulty, increased time, bed mobility, sleep disturbed, grooming/hygiene/self-care activities, standing tasks, sitting tasks, driving/riding in a vehicle, bending/squatting/lifting, pushing/pulling, standing, sitting and walking, all types of transfers, community distances, household  distances, now needs to use assistive device, stairs, walking quickly as required to cross a street/exit a building rapidly  Prior Level of Function: worsening pain and function, therefore underwent surgery,  Personal Occupations Profile Affected:   Patient Goals: decreased edema, decreased pain, improved balance, increased motion, increased strength and independence with ADLs/IADLs    Prior treatment: inpatient PT  Discharged from hospital, home health, or skilled nursing facility in last 30 days: yes  Home Environment:   Patient lives with significant other  Type of home: multiple level home (14 stairs)  Feels safe at home/work/school.  2 or more falls or an unexplained fall with injury in the last year:  No    OBJECTIVE                                                                                                                     Observed Gait:     Assistive Device: wheeled walker    Weight bearing: Right: as tolerated     Analysis: decreased cesar/pace and forward flexed; left: decreased stride length; right: decreased stance time, decreased stride length, antalgic, decreased hip extension, trendelenburg and forward flexed hip  Incision/Wound:    Location: Right hip; bandaged and wound vac in place  Distal RLE NVI. Swelling at the hip and knee but denies numbness or tingling. Incision covered, not draining through bandage. Slight increased warmth at the hip as expected post operatively.    Range of Motion (ROM) (norms in parentheses, measurement in degrees unless noted):       Hip Flexion (100-120): Right: Passive: 70  Hip ABDuction (40): Right: Passive: 12  Knee Flexion (150): Right: Active: 100  Knee Extension (0-10): Right: Active: 8      Strength  out of 5 unless otherwise indicated, standard test position unless noted, lbs tested with hand held dynamometer:   Hip Flexion: Left: 5  Hip Extension: Left: 3+  Hip Abduction: Left: 3+  Knee Flexion: Left: 5 Right: 3+  Knee Extension: Left: 5 Right:  3+  Ankle Dorsiflexion: Left: 5 Right: 4  Ankle Plantar Flexion: Left: 5 Right: 4  Comments / Details:   Right hip NT due to recent surgical procedure      Lower Extremity Functional Scale (LEFS):       scored 0=extreme difficulty; 80=no difficulty    TREATMENT                                                                                                                initial evaluation completed  Therapeutic Exercise:  Patient education regarding diagnosis, specific symptoms, findings from evaluation, realistic expectations and rehabilitation plan and goals. Reviewed need for compliance with HEP to achieve optimal results.  Patient instructed in proper stretching technique, hold and importance to perform after the muscles are warmed up for lasting effect. Preferred time of stretch after exercise, hot shower or gentle warm up.    Table exercises:  Ankle pumps 1x10  Quad sets 1x10  Heel slides with slide board 1x5  Glut sets 1x10  Hip abduction supine 1x10  Gastroc stretch with strap seated with foot on floor 2x30\"    Skilled input: verbal instruction/cues, tactile instruction/cues, posture correction and facilitation    Home Exercise Program: (*above indicates provided as part of home exercise program)  Ankle pumps, quad sets, glut sets, heel slides, supine hip abduction, gastroc stretch seated      ASSESSMENT                                                                                                               74 year old male patient has signs and symptoms consistent with  right hip with increased pain/symptoms, impaired posture, impaired range of motion, impaired muscle length/flexibility, impaired balance, impaired gait, increased swelling, impaired tissue mobility, impaired strength with reported functional limitations listed above.  Patient is a 74 year old male who presents to outpatient PT with reports of Right hip pain s/p right anterior TUAN (DOS 11/4/19). Findings are consistent with referring  diagnosis, as well as decreased functional mobility. This condition is mechanical and inflammatory in nature, acute stage. Specific examination findings from today include impairments with: gait, strength, activity tolerance, balance, joint motion, pain, edema/ effusion, tone, posture/ biomechanics. Patient educated on exam findings, exercise education specific to these findings, anatomy, compliance to therapy, rehabilitation goals and exercise program. Patient verbalized good understanding. Patient had a good response to physical therapy intervention today, as witnessed by no increase in pain with initial exercises. Patient is agreeable to proceed with therapy as outlined. Would benefit from formal physical therapy program to address the above mentioned deficits. As the supervising therapist, I discussed the plan of care with the physical therapy assistant. Recommending 2 x/week for 10 visits.     Prognosis: patient will benefit from skilled therapy Potential: very good  Predicted patient presentation: Low (stable) - Patient comorbidities and complexities, as defined above, will have little effect on progress for prescribed plan of care.      Pain/symptoms after session: 2  Patient Education:   Who will be receiving education: patient  Are they ready to learn: yes  Preferred learning style: written, verbal and demonstration  Barriers to learning: no barriers apparent at this time  Results of above outlined education: Verbalizes understanding and Demonstrates understanding   PLAN                                                                                                                           The following skilled interventions to be implemented to achieve goals listed below: Manual Therapy  Gait Training  Neuromuscular Re-Education  Therapeutic Activity  Electrical Stimulation  Therapeutic Exercise  Heat/Cold    Frequency / Duration: 2 times per week tapering as patient progresses for an estimated total of  10 visits    patient involved in and agreed to plan of care and goals.  Patient has been given attendance policy at time of initial evaluation.    Suggestions for next session as indicated: Progress per plan of care    GOALS                                                                                                                       Long Term Goals: To be met by end of plan of care:      Home Exercise Program: Independent with progressed and modified home exercise program (HEP)      Status:  Progressing/ongoing    Pain: Decrease pain/symptoms to 0     Status:  Progressing/ongoing  Strength: Improve involved strength to  4+    Status:  Progressing/ongoing  Range of Motion: Improve involved range of motion (ROM) to   Symmetrical with uninvolved side.    Status:  Progressing/ongoing    Ambulation: Demonstrate ability to negotiate level and unlevel surfaces at variable velocities, including change of direction without increased pain or instability to return to age appropriate and community activities at prior level of function     Status:  Progressing/ongoing  Stairs: Ascend and descend 1 flight, adequate for community needs, adequate for home access/environment, modified indpendent and without reported difficulty     Status:  Progressing/ongoing      Procedures and total treatment time documented Time Entry flowsheet.     good balance

## 2022-08-18 NOTE — OCCUPATIONAL THERAPY INITIAL EVALUATION ADULT - GENERAL OBSERVATIONS, REHAB EVAL
Chart reviewed and event noted to date. Patient encountered sittign in recliner chair, NAD, A&Ox4, WBAT LLE s/p L TKA, +hemovac, +cardiac monitor, dressing to L knee C/D/I.

## 2022-08-18 NOTE — DISCHARGE NOTE PROVIDER - NSDCFUSCHEDAPPT_GEN_ALL_CORE_FT
[facility] Physician [facility]  ORTHOSURG [address]  Scheduled Appointment: 09/09/2022

## 2022-08-18 NOTE — CONSULT NOTE ADULT - SUBJECTIVE AND OBJECTIVE BOX
Chief Complaint:  Patient is a 66y old  Female who presents with a chief complaint of Left TKR .    HPI: Currently no sob or chest pain or palpitation . No current cough or fever . No current nausea or vomiting . Voiding . Pain controlled .       Review of Systems:    General:  No wt loss, fevers, chills, night sweats  Eyes:  Good vision, no reported pain  ENT:  No sore throat, pain, runny nose, dysphagia  CV:  No pain, palpitations, hypo/hypertension  Resp:  No dyspnea, cough, tachypnea, wheezing  GI:  No pain, nausea, vomiting, diarrhea, constipation  :  No pain, bleeding, incontinence, nocturia  Muscle:  No pain, weakness  Breast:  No pain, abscess, mass, discharge  Neuro:  No weakness, tingling, memory problems  Psych:  No fatigue, insomnia, mood problems, depression  Endocrine:  No polyuria, polydypsia, cold/heat intolerance  Heme:  No petechiae, ecchymosis, easy bruisability  Skin:  No rash, tattoos, scars, edema    Relevant Family History: HTN . Afib . Hyperlipidemia . Appendectomy . Allergy : NKDA .      Relevant Social History: NC .     PMH : HTN . Afib . Hyperlipidemia .Meds : Norvasc . Xarelto . Lipitor .Diovan/HCTZ .    Physical Exam:      Vital Signs:  Vital Signs Last 24 Hrs  T(C): 36.9 (18 Aug 2022 18:12), Max: 37.2 (18 Aug 2022 17:12)  T(F): 98.5 (18 Aug 2022 18:12), Max: 98.9 (18 Aug 2022 17:12)  HR: 79 (18 Aug 2022 18:47) (69 - 86)  BP: 116/75 (18 Aug 2022 18:47) (91/48 - 124/84)  BP(mean): --  RR: 16 (18 Aug 2022 18:12) (12 - 17)  SpO2: 96% (18 Aug 2022 18:47) (94% - 100%)    Parameters below as of 18 Aug 2022 18:47  Patient On (Oxygen Delivery Method): room air      Daily Height in cm: 157.48 (18 Aug 2022 09:09)    Daily   I&O's Summary    18 Aug 2022 07:01  -  18 Aug 2022 22:17  --------------------------------------------------------  IN: 1150 mL / OUT: 250 mL / NET: 900 mL        General:  Appears stated age, well-groomed, well-nourished, no distress  HEENT:  NC/AT, patent nares w/ pink mucosa, OP clear w/o lesions, PERRL, EOMI, conjunctivae clear, no thyromegaly, nodules, adenopathy, no JVD  Chest:  Full & symmetric excursion, no increased effort, breath sounds clear  Cardiovascular:  Regular rhythm, S1, S2, no murmur/rub/S3/S4, no carotid/femoral/abdominal bruit, radial/pedal pulses 2+, no edema  Abdomen:  Soft, non-tender, non-distended, normoactive bowel sounds, no HSM  Extremities: + Drain left knee . + pulse .  Skin:  No rash/erythema/ecchymoses/petechiae/wounds/abscess/warm/dry  Musculoskeletal: Intact .  Neuro/Psych:  Alert, oriented, normal and symmetric strength in UEs, LEs .      Laboratory:                            11.6   5.91  )-----------( 209      ( 18 Aug 2022 13:15 )             35.8     08-18    140  |  110<H>  |  15  ----------------------------<  147<H>  3.5   |  23  |  0.66    Ca    8.9      18 Aug 2022 13:15            CAPILLARY BLOOD GLUCOSE          PT/INR - ( 18 Aug 2022 08:45 )   PT: 13.1 sec;   INR: 1.10 ratio         PTT - ( 18 Aug 2022 08:45 )  PTT:29.8 sec      Imaging:      Assessment: S/P left knee replacement . HTN . Afib . Hyperlipidemia .      Plan: PT/OT . Pain control . DVT prophylaxis . Incentive spirometry . Current meds reviewed .

## 2022-08-18 NOTE — DISCHARGE NOTE PROVIDER - NSDCMRMEDTOKEN_GEN_ALL_CORE_FT
amLODIPine 5 mg oral tablet: 1 tab(s) orally once a day  atorvastatin 20 mg oral tablet: 1 tab(s) orally once a day  baclofen 10 mg oral tablet: 1 tab(s) orally 3 times a day, As Needed  COVID 19 PCR SWAB:   Metoprolol Succinate  mg oral tablet, extended release: 1 tab(s) orally once a day  rivaroxaban 20 mg oral tablet: 1 tab(s) orally once a day (in the evening)  valsartan-hydrochlorothiazide 320 mg-25 mg oral tablet: 1 tab(s) orally once a day   amLODIPine 5 mg oral tablet: 1 tab(s) orally once a day  atorvastatin 20 mg oral tablet: 1 tab(s) orally once a day  baclofen 10 mg oral tablet: 1 tab(s) orally 3 times a day, As Needed  Colace 100 mg oral capsule: 1 cap(s) orally once a day, As Needed -for constipation MDD:1  Metoprolol Succinate  mg oral tablet, extended release: 1 tab(s) orally once a day  ondansetron 4 mg oral tablet: 1 tab(s) orally every 8 hours MDD:one tab every 8 hours as needed  oxyCODONE 5 mg oral tablet: 1 tab(s) orally every 4 hours, As Needed -for severe pain MDD:6  rivaroxaban 10 mg oral tablet: 1 tab(s) orally once a day MDD:1  valsartan-hydrochlorothiazide 320 mg-25 mg oral tablet: 1 tab(s) orally once a day

## 2022-08-18 NOTE — PHYSICAL THERAPY INITIAL EVALUATION ADULT - LIVES WITH, PROFILE
Verified postop, Pt lives with son with TBI and has HHA for the son, states that she has friends that will be able to assist her after surgery. Pt lives in private house with 3 steps to enter with L-HR up. Once inside, there is a flight of 13 steps with R-HR to the second floor, where the bedroom and one full bathroom is. Per pt, her uncle will be installing B/L HR for the flight of stairs.

## 2022-08-19 ENCOUNTER — TRANSCRIPTION ENCOUNTER (OUTPATIENT)
Age: 67
End: 2022-08-19

## 2022-08-19 VITALS
HEART RATE: 68 BPM | RESPIRATION RATE: 16 BRPM | OXYGEN SATURATION: 97 % | TEMPERATURE: 98 F | DIASTOLIC BLOOD PRESSURE: 82 MMHG | SYSTOLIC BLOOD PRESSURE: 133 MMHG

## 2022-08-19 LAB
ANION GAP SERPL CALC-SCNC: 5 MMOL/L — SIGNIFICANT CHANGE UP (ref 5–17)
BUN SERPL-MCNC: 11 MG/DL — SIGNIFICANT CHANGE UP (ref 7–23)
CALCIUM SERPL-MCNC: 8.7 MG/DL — SIGNIFICANT CHANGE UP (ref 8.5–10.1)
CHLORIDE SERPL-SCNC: 108 MMOL/L — SIGNIFICANT CHANGE UP (ref 96–108)
CO2 SERPL-SCNC: 28 MMOL/L — SIGNIFICANT CHANGE UP (ref 22–31)
CREAT SERPL-MCNC: 0.54 MG/DL — SIGNIFICANT CHANGE UP (ref 0.5–1.3)
EGFR: 103 ML/MIN/1.73M2 — SIGNIFICANT CHANGE UP
GLUCOSE SERPL-MCNC: 107 MG/DL — HIGH (ref 70–99)
HCT VFR BLD CALC: 32.4 % — LOW (ref 34.5–45)
HGB BLD-MCNC: 10.3 G/DL — LOW (ref 11.5–15.5)
MCHC RBC-ENTMCNC: 29 PG — SIGNIFICANT CHANGE UP (ref 27–34)
MCHC RBC-ENTMCNC: 31.8 G/DL — LOW (ref 32–36)
MCV RBC AUTO: 91.3 FL — SIGNIFICANT CHANGE UP (ref 80–100)
NRBC # BLD: 0 /100 WBCS — SIGNIFICANT CHANGE UP (ref 0–0)
PLATELET # BLD AUTO: 200 K/UL — SIGNIFICANT CHANGE UP (ref 150–400)
POTASSIUM SERPL-MCNC: 3.5 MMOL/L — SIGNIFICANT CHANGE UP (ref 3.5–5.3)
POTASSIUM SERPL-SCNC: 3.5 MMOL/L — SIGNIFICANT CHANGE UP (ref 3.5–5.3)
RBC # BLD: 3.55 M/UL — LOW (ref 3.8–5.2)
RBC # FLD: 13.3 % — SIGNIFICANT CHANGE UP (ref 10.3–14.5)
SODIUM SERPL-SCNC: 141 MMOL/L — SIGNIFICANT CHANGE UP (ref 135–145)
WBC # BLD: 8.03 K/UL — SIGNIFICANT CHANGE UP (ref 3.8–10.5)
WBC # FLD AUTO: 8.03 K/UL — SIGNIFICANT CHANGE UP (ref 3.8–10.5)

## 2022-08-19 PROCEDURE — 73560 X-RAY EXAM OF KNEE 1 OR 2: CPT | Mod: 26,LT

## 2022-08-19 RX ORDER — OXYCODONE HYDROCHLORIDE 5 MG/1
1 TABLET ORAL
Qty: 42 | Refills: 0
Start: 2022-08-19 | End: 2022-08-25

## 2022-08-19 RX ORDER — RIVAROXABAN 15 MG-20MG
1 KIT ORAL
Qty: 0 | Refills: 0 | DISCHARGE
Start: 2022-08-19

## 2022-08-19 RX ORDER — ONDANSETRON 8 MG/1
1 TABLET, FILM COATED ORAL
Qty: 15 | Refills: 0
Start: 2022-08-19 | End: 2022-08-23

## 2022-08-19 RX ORDER — OXYCODONE HYDROCHLORIDE 5 MG/1
1 TABLET ORAL
Qty: 30 | Refills: 0
Start: 2022-08-19 | End: 2022-08-23

## 2022-08-19 RX ORDER — RIVAROXABAN 15 MG-20MG
1 KIT ORAL
Qty: 30 | Refills: 0
Start: 2022-08-19 | End: 2022-09-17

## 2022-08-19 RX ORDER — DOCUSATE SODIUM 100 MG
1 CAPSULE ORAL
Qty: 7 | Refills: 0
Start: 2022-08-19 | End: 2022-08-25

## 2022-08-19 RX ORDER — RIVAROXABAN 15 MG-20MG
1 KIT ORAL
Qty: 0 | Refills: 0 | DISCHARGE

## 2022-08-19 RX ADMIN — OXYCODONE HYDROCHLORIDE 10 MILLIGRAM(S): 5 TABLET ORAL at 02:54

## 2022-08-19 RX ADMIN — Medication 100 MILLIGRAM(S): at 02:30

## 2022-08-19 RX ADMIN — OXYCODONE HYDROCHLORIDE 10 MILLIGRAM(S): 5 TABLET ORAL at 11:30

## 2022-08-19 RX ADMIN — Medication 975 MILLIGRAM(S): at 13:56

## 2022-08-19 RX ADMIN — PANTOPRAZOLE SODIUM 40 MILLIGRAM(S): 20 TABLET, DELAYED RELEASE ORAL at 05:41

## 2022-08-19 RX ADMIN — Medication 975 MILLIGRAM(S): at 05:41

## 2022-08-19 RX ADMIN — OXYCODONE HYDROCHLORIDE 10 MILLIGRAM(S): 5 TABLET ORAL at 14:55

## 2022-08-19 RX ADMIN — Medication 100 MILLIGRAM(S): at 05:41

## 2022-08-19 RX ADMIN — CELECOXIB 200 MILLIGRAM(S): 200 CAPSULE ORAL at 05:41

## 2022-08-19 RX ADMIN — OXYCODONE HYDROCHLORIDE 10 MILLIGRAM(S): 5 TABLET ORAL at 13:55

## 2022-08-19 RX ADMIN — Medication 975 MILLIGRAM(S): at 14:55

## 2022-08-19 RX ADMIN — RIVAROXABAN 10 MILLIGRAM(S): KIT at 08:12

## 2022-08-19 RX ADMIN — Medication 975 MILLIGRAM(S): at 06:41

## 2022-08-19 RX ADMIN — Medication 1 MILLIGRAM(S): at 10:29

## 2022-08-19 RX ADMIN — Medication 1 TABLET(S): at 10:29

## 2022-08-19 RX ADMIN — CELECOXIB 200 MILLIGRAM(S): 200 CAPSULE ORAL at 06:41

## 2022-08-19 RX ADMIN — OXYCODONE HYDROCHLORIDE 10 MILLIGRAM(S): 5 TABLET ORAL at 10:29

## 2022-08-19 RX ADMIN — OXYCODONE HYDROCHLORIDE 10 MILLIGRAM(S): 5 TABLET ORAL at 03:54

## 2022-08-19 NOTE — PROGRESS NOTE ADULT - SUBJECTIVE AND OBJECTIVE BOX
Patient seen and examined at bedside this AM. No acute complaints at this time. Pain well controlled with medications. Denies chest pain, shortness of breath, nausea or vomiting.     LABS:                        11.6   5.91  )-----------( 209      ( 18 Aug 2022 13:15 )             35.8     08-18    140  |  110<H>  |  15  ----------------------------<  147<H>  3.5   |  23  |  0.66    Ca    8.9      18 Aug 2022 13:15      PT/INR - ( 18 Aug 2022 08:45 )   PT: 13.1 sec;   INR: 1.10 ratio         PTT - ( 18 Aug 2022 08:45 )  PTT:29.8 sec      VITAL SIGNS:  T(C): 36.4 (08-19-22 @ 05:30), Max: 37.2 (08-18-22 @ 17:12)  HR: 63 (08-19-22 @ 05:30) (57 - 86)  BP: 112/68 (08-19-22 @ 05:30) (91/48 - 124/84)  RR: 18 (08-19-22 @ 05:30) (12 - 18)  SpO2: 94% (08-19-22 @ 05:30) (94% - 100%)    General: NAD, resting comfortably in bed  L LE:   HMV removed, tip intact   Dressing C/D/I  SCDs present bilaterally  Compartments soft and compressible  No calf tenderness bilaterally  +TA/EHL/FHL/GSC  SILT L3-S1   + DP      A/P:  66yF s/p L TKA POD 1    -HMV removed, tip intact  -PT/OT   -WBAT  -Pain Control  -DVT ppx Xarelto  -Continue perioperative abx x 24 hours  -FU AM Labs  -Rest, ice, compress and elevate the extremity as we needed  -Incentive Spirometry  -Medical management appreciated  -Dispo: home w/ home PT, likely home today

## 2022-08-19 NOTE — DISCHARGE NOTE NURSING/CASE MANAGEMENT/SOCIAL WORK - NSDCPEFALRISK_GEN_ALL_CORE
For information on Fall & Injury Prevention, visit: [email or web address] OR  [email or web address] OR  https://www.cdc.gov/steadi/patient.html

## 2022-08-19 NOTE — DISCHARGE NOTE NURSING/CASE MANAGEMENT/SOCIAL WORK - PATIENT PORTAL LINK FT
You can access the FollowMyHealth Patient Portal offered by [facility] by registering at the following website: [email or web address]. By joining [facility]’s FollowMyHealth portal, you will also be able to view your health information using other applications (apps) compatible with our system.

## 2022-08-21 DIAGNOSIS — M17.12 UNILATERAL PRIMARY OSTEOARTHRITIS, LEFT KNEE: ICD-10-CM

## 2022-08-21 DIAGNOSIS — I48.91 UNSPECIFIED ATRIAL FIBRILLATION: ICD-10-CM

## 2022-08-21 DIAGNOSIS — E78.5 HYPERLIPIDEMIA, UNSPECIFIED: ICD-10-CM

## 2022-08-21 DIAGNOSIS — I10 ESSENTIAL (PRIMARY) HYPERTENSION: ICD-10-CM

## 2022-09-08 VITALS — WEIGHT: 220 LBS | HEIGHT: 62 IN | BODY MASS INDEX: 40.48 KG/M2

## 2022-09-09 ENCOUNTER — APPOINTMENT (OUTPATIENT)
Dept: ORTHOPEDIC SURGERY | Facility: CLINIC | Age: 67
End: 2022-09-09

## 2022-09-09 PROCEDURE — 99024 POSTOP FOLLOW-UP VISIT: CPT

## 2022-09-09 NOTE — PROCEDURE
[de-identified] : Observation on incision dry, clean, intact, well healed. Method staple removing kit. Incision site Cleaned with iodine swab after staples are completely removed. Instructions Keep incision dry and clean, allowed to shower and pat site dry, do not rub dry, contact office is site becomes red, swollen, infected, or you develop a fever. \par \par

## 2022-09-09 NOTE — HISTORY OF PRESENT ILLNESS
[Clean/Dry/Intact] : clean, dry and intact [Healed] : healed [Swelling] : swollen [Neuro Intact] : an unremarkable neurological exam [Vascular Intact] : ~T peripheral vascular exam normal [Negative Doug's] : maneuvers demonstrated a negative Doug's sign [Doing Well] : is doing well [No Sign of Infection] : is showing no signs of infection [Adequate Pain Control] : has adequate pain control [Staples Removed] : staples were removed [Chills] : no chills [Constipation] : no constipation [Diarrhea] : no diarrhea [Dysuria] : no dysuria [Fever] : no fever [Nausea] : no nausea [Vomiting] : no vomiting [Erythema] : not erythematous [Discharge] : absent of discharge [Dehiscence] : not dehisced [de-identified] : Post-op visit [de-identified] : Patient presents today for the F/U S/P left TKR done 3 weeks ago. Patient is doing well and undergoing P.T> with improvement. Takes Tylenol for pain meds and Xarelto for DVT prophylaxis. I went over post-op care and answered all his questions. I also provided him with surgical report for his records. [de-identified] : ROM 0-85\par  [de-identified] : Continue P.T>, pain management and DVT prophylaxis. F/U in 1 month with x-rays.

## 2022-10-07 ENCOUNTER — APPOINTMENT (OUTPATIENT)
Dept: ORTHOPEDIC SURGERY | Facility: CLINIC | Age: 67
End: 2022-10-07

## 2022-10-07 VITALS — WEIGHT: 211 LBS | BODY MASS INDEX: 38.83 KG/M2 | HEIGHT: 62 IN

## 2022-10-07 PROCEDURE — 73562 X-RAY EXAM OF KNEE 3: CPT | Mod: LT

## 2022-10-07 PROCEDURE — 99024 POSTOP FOLLOW-UP VISIT: CPT

## 2022-10-07 NOTE — HISTORY OF PRESENT ILLNESS
[de-identified] : SHIRLENE SORIANO year old female presents for follow-up evaluation of her left knee. She is 6 weeks status post left TKR on 8/18/2022. Overall, the patient is doing well. She denies any pain today in the office. The patient has been attending physical therapy for this issue with improvement. Of note, the patient reports concerns of right sided sciatica. She denies any prior evaluation for this issue.

## 2022-10-07 NOTE — REASON FOR VISIT
[Follow-Up Visit] : a follow-up visit for [Other: ____] : [unfilled] Quality 138: Melanoma: Coordination Of Care: A treatment plan was communicated to the physicians providing continuing care within one month of diagnosis outlining: diagnosis, tumor thickness and a plan for surgery or alternate care. Quality 47: Advance Care Plan: Advance Care Planning discussed and documented; advance care plan or surrogate decision maker documented in the medical record. Quality 111:Pneumonia Vaccination Status For Older Adults: Pneumococcal Vaccination Previously Received Quality 137: Melanoma: Continuity Of Care - Recall System: Patient information entered into a recall system that includes: target date for the next exam specified AND a process to follow up with patients regarding missed or unscheduled appointments Detail Level: Detailed Quality 130: Documentation Of Current Medications In The Medical Record: Current Medications Documented Quality 226: Preventive Care And Screening: Tobacco Use: Screening And Cessation Intervention: Patient screened for tobacco use and is an ex/non-smoker Quality 397: Melanoma: Reporting: The pathology report includes a pT Category and statement on thickness and ulceration for pT1, mitotic rate. Quality 110: Preventive Care And Screening: Influenza Immunization: Influenza Immunization previously received during influenza season Quality 431: Preventive Care And Screening: Unhealthy Alcohol Use - Screening: Patient screened for unhealthy alcohol use using a single question and scores less than 2 times per year

## 2022-10-07 NOTE — DISCUSSION/SUMMARY
[de-identified] : The patient is doing well 6 weeks status post left TKR on 8/18/2022. I have recommended that the patient continue with PT, weight loss, and Advil or Aleve prn. As for her concerns of back pain and right sided sciatia, I have recommended follow-up evaluation with Dr. Franz.  Patient can continue activities as tolerated. All questions answered, understanding verbalized. Patient in agreement with plan of care. \par \par Patient may follow up with x-ray in 6-8 weeks.\par \par

## 2022-10-07 NOTE — ADDENDUM
[FreeTextEntry1] : This note was written by Hermila León on 10/07/2022 acting as scribe for Dr. Mekhi Sanchez M.D.\par \par I, Dr. Mekhi Sanchez, have read and attest that all the information, medical decision making and discharge instructions within are true and accurate.\par

## 2022-10-07 NOTE — PHYSICAL EXAM
[de-identified] : General appearance: well nourished and hydrated, pleasant, alert and oriented x 3, cooperative.\par HEENT: Normocephalic, EOM intact, Nasal septum midline, Oral cavity clear, External auditory canal clear.\par Cardiovascular: no apparent abnormalities, no lower leg edema, no varicosities, pedal pulses are palpable.\par Lymphatics Lymph nodes: none palpated, Lymphedema: not present.\par Neurologic: sensation is normal, no muscle weakness in upper or lower extremities, patella tendon reflexes intact .\par Dermatologic no apparent skin lesions, moist, warm, no rash.\par Spine:cervical spine appears normal and moves freely, thoracic spine appears normal and moves freely, lumbosacral spine appears normal and moves freely.\par Gait: nonantalgic.		\par \par Left Knee\par Inspection: no effusion\par Wounds: healed midline incision\par Alignment: normal.\par Palpation: no specific tenderness on palpation.\par ROM: Active (in degrees): 0-95\par Ligamentous laxity (neg): negative ant. drawer test, negative post. drawer test, stable to varus stress test, stable to valgus stress test,\par Patellofemoral Alignment Test: Q angle-, normal.\par Muscle Test: good quad strength.\par Leg examination: calf is soft and non-tender.		 [de-identified] : Left knee xray, AP, lateral, merchant view taken at the office today demonstrates a total knee replacement in satisfactory position and alignment. No evidence of loosening. The patella sits in a central position		\par \par Right knee xray merchant view taken at the office today demonstrates joint space narrowing, severe patellofemoral arthritis

## 2022-10-29 PROBLEM — M54.50 LUMBAGO: Status: ACTIVE | Noted: 2022-10-29

## 2022-10-31 ENCOUNTER — APPOINTMENT (OUTPATIENT)
Dept: ORTHOPEDIC SURGERY | Facility: CLINIC | Age: 67
End: 2022-10-31
Payer: MEDICARE

## 2022-10-31 VITALS — WEIGHT: 211 LBS | HEIGHT: 62 IN | BODY MASS INDEX: 38.83 KG/M2

## 2022-10-31 DIAGNOSIS — M54.50 LOW BACK PAIN, UNSPECIFIED: ICD-10-CM

## 2022-10-31 DIAGNOSIS — M43.16 SPONDYLOLISTHESIS, LUMBAR REGION: ICD-10-CM

## 2022-10-31 DIAGNOSIS — M51.36 OTHER INTERVERTEBRAL DISC DEGENERATION, LUMBAR REGION: ICD-10-CM

## 2022-10-31 PROCEDURE — 99215 OFFICE O/P EST HI 40 MIN: CPT

## 2022-10-31 PROCEDURE — 72100 X-RAY EXAM L-S SPINE 2/3 VWS: CPT

## 2022-10-31 PROCEDURE — 99213 OFFICE O/P EST LOW 20 MIN: CPT | Mod: 24

## 2022-12-02 DIAGNOSIS — Z00.00 ENCOUNTER FOR GENERAL ADULT MEDICAL EXAMINATION W/OUT ABNORMAL FINDINGS: ICD-10-CM

## 2022-12-05 ENCOUNTER — APPOINTMENT (OUTPATIENT)
Dept: ORTHOPEDIC SURGERY | Facility: CLINIC | Age: 67
End: 2022-12-05

## 2022-12-12 ENCOUNTER — APPOINTMENT (OUTPATIENT)
Dept: ORTHOPEDIC SURGERY | Facility: CLINIC | Age: 67
End: 2022-12-12

## 2022-12-16 ENCOUNTER — APPOINTMENT (OUTPATIENT)
Dept: ORTHOPEDIC SURGERY | Facility: CLINIC | Age: 67
End: 2022-12-16

## 2022-12-16 VITALS — HEIGHT: 62 IN | WEIGHT: 209 LBS | BODY MASS INDEX: 38.46 KG/M2

## 2022-12-16 PROCEDURE — 73562 X-RAY EXAM OF KNEE 3: CPT | Mod: LT

## 2022-12-16 PROCEDURE — 99213 OFFICE O/P EST LOW 20 MIN: CPT

## 2022-12-16 NOTE — HISTORY OF PRESENT ILLNESS
[de-identified] : SHIRLENE SORIANO year old female presents for follow-up evaluation s/p left TKR at 4 months. Recovering very well and has no pain. She has some PT left. Saw Dr. Franz who also rec PT for spine and right sided sciatica pain. Overall pt is happy with her TKR.

## 2022-12-16 NOTE — DISCUSSION/SUMMARY
[de-identified] : The patient is doing well 6 weeks status post left TKR on 8/18/2022. I have recommended that the patient continue with PT, weight loss, and Advil or Aleve prn. As for her concerns of back pain and right sided sciatia, I have recommended follow-up evaluation with Dr. Franz.  Patient can continue activities as tolerated. All questions answered, understanding verbalized. Patient in agreement with plan of care. \par \par Patient may follow up with x-ray in 3 months with xrays

## 2022-12-16 NOTE — PHYSICAL EXAM
[de-identified] : General appearance: well nourished and hydrated, pleasant, alert and oriented x 3, cooperative.\par HEENT: Normocephalic, EOM intact, Nasal septum midline, Oral cavity clear, External auditory canal clear.\par Cardiovascular: no apparent abnormalities, no lower leg edema, no varicosities, pedal pulses are palpable.\par Lymphatics Lymph nodes: none palpated, Lymphedema: not present.\par Neurologic: sensation is normal, no muscle weakness in upper or lower extremities, patella tendon reflexes intact .\par Dermatologic no apparent skin lesions, moist, warm, no rash.\par Spine:cervical spine appears normal and moves freely, thoracic spine appears normal and moves freely, lumbosacral spine appears normal and moves freely.\par Gait: nonantalgic.		\par \par Left Knee\par Inspection: no effusion\par Wounds: healed midline incision\par Alignment: normal.\par Palpation: no specific tenderness on palpation.\par ROM: Active (in degrees): 0-115\par Ligamentous laxity (neg): negative ant. drawer test, negative post. drawer test, stable to varus stress test, stable to valgus stress test,\par Patellofemoral Alignment Test: Q angle-, normal.\par Muscle Test: good quad strength.\par Leg examination: calf is soft and non-tender.		 [de-identified] : Left knee xray, AP, lateral, merchant view taken at the office today demonstrates a total knee replacement in satisfactory position and alignment. No evidence of loosening. The patella sits in a central position		\par \par Right knee xray merchant view taken at the office today demonstrates joint space narrowing, osteophytes, severe patellofemoral arthritis

## 2022-12-16 NOTE — ADDENDUM
[FreeTextEntry1] : This note was written by ZOË CLEVELAND on 12/16/2022 acting as scribe for Dr. Mekhi Sanchez M.D.\par \par I, Dr. Mekhi Sanchez, have read and attest that all the information, medical decision making and discharge instructions within are true and accurate.

## 2023-01-01 NOTE — OCCUPATIONAL THERAPY INITIAL EVALUATION ADULT - PHYSICAL ASSIST/NONPHYSICAL ASSIST:TOILET, OT EVAL

## 2023-01-06 NOTE — PHYSICAL THERAPY INITIAL EVALUATION ADULT - IMPAIRMENTS CONTRIBUTING TO GAIT DEVIATIONS, PT EVAL
Xenograft Text: Because of the size and depth of the defect and to facilitate healing by granulation, a tissue-cultured epidermal autograft was planned.  After prep and local anesthesia, Xenograft material was trimmed to fi the defect and secured impaired balance/pain/decreased ROM/decreased strength

## 2023-03-22 ENCOUNTER — APPOINTMENT (OUTPATIENT)
Dept: ORTHOPEDIC SURGERY | Facility: CLINIC | Age: 68
End: 2023-03-22
Payer: MEDICARE

## 2023-03-22 VITALS — HEIGHT: 62 IN | WEIGHT: 212 LBS | BODY MASS INDEX: 39.01 KG/M2

## 2023-03-22 PROCEDURE — 99213 OFFICE O/P EST LOW 20 MIN: CPT

## 2023-03-22 PROCEDURE — 73562 X-RAY EXAM OF KNEE 3: CPT | Mod: LT

## 2023-03-22 NOTE — PHYSICAL EXAM
[de-identified] : General appearance: well nourished and hydrated, pleasant, alert and oriented x 3, cooperative.\par HEENT: Normocephalic, EOM intact, Nasal septum midline, Oral cavity clear, External auditory canal clear.\par Cardiovascular: no apparent abnormalities, no lower leg edema, no varicosities, pedal pulses are palpable.\par Lymphatics Lymph nodes: none palpated, Lymphedema: not present.\par Neurologic: sensation is normal, no muscle weakness in upper or lower extremities, patella tendon reflexes intact .\par Dermatologic no apparent skin lesions, moist, warm, no rash.\par Spine:cervical spine appears normal and moves freely, thoracic spine appears normal and moves freely, lumbosacral spine appears normal and moves freely.\par Gait: nonantalgic.		\par \par Left Knee\par Inspection: no effusion\par Wounds: healed midline incision\par Alignment: normal.\par Palpation: no specific tenderness on palpation.\par ROM: Active (in degrees): 0-120\par Ligamentous laxity (neg): negative ant. drawer test, negative post. drawer test, stable to varus stress test, stable to valgus stress test,\par Patellofemoral Alignment Test: Q angle-, normal.\par Muscle Test: good quad strength.\par Leg examination: calf is soft and non-tender.  [de-identified] : Left knee xray, AP, lateral, merchant view taken at the office today demonstrates a total knee replacement in satisfactory position and alignment. No evidence of loosening. The patella sits in a central position		\par \par Right knee xray merchant view taken at the office today demonstrates joint space narrowing, osteophytes, severe patellofemoral arthritis.

## 2023-03-22 NOTE — ADDENDUM
[FreeTextEntry1] : This note was written by Elle Burkett on 03/22/2023 acting as scribe for Dr. Mekhi Sanchez M.D.\par \par I, Dr. Mekhi Sanchez, have read and attest that all the information, medical decision making and discharge instructions within are true and accurate.\par \par This note was written by ZOË CLEVELAND on 03/22/2023 acting as scribe for Dr. Mekhi Sanchez M.D.\par \par I, Dr. Mekhi Sanchez, have read and attest that all the information, medical decision making and discharge instructions within are true and accurate.

## 2023-03-22 NOTE — HISTORY OF PRESENT ILLNESS
[de-identified] : SHIRLENE SORIANO is a 67 year old female who presents for follow up evaluation s/p left TKR at 7 months. Pt is doing well with no pain, except when kneeling - using a pad. Still has issues related to right sided sciatica, following with Dr Franz and undergoing PT - mild improvement.

## 2023-03-22 NOTE — DISCUSSION/SUMMARY
[de-identified] : Patient is doing well following their s/p L TKR at 7 months.I reviewed x-rays with them. I have reassured them that their implants are functioning well.\par \par She is encouraged to continue to stay active with a home exercise program.\par \par I will see them back in 6 months with x-ray for an anniversary check.\par \par Patient is being seen for Dr. Franz for right-sided sciatica which she funds to be a functional issue.

## 2023-03-31 NOTE — DISCHARGE NOTE PROVIDER - NSDCFUADDINST_GEN_ALL_CORE_FT
diarrhea, abd pain. 1.  Pain Control with Oxycodone as needed. A prescription has been sent to your pharmacy. Please try over- the-counter medications, such as tylenol or ibuprofen, as this may resolve your pain without the need for narcotic medication (Oxycodone).  2.  Walking with full weight bearing as tolerated, with assistive devices (walker/Cane as Needed)  3.  Blood clot prevention with Xarelto.  4.  Physical Therapy as needed  5.  Follow up with Dr. Sanchez as Outpatient in 10-14 Days after Discharge from the Hospital or Rehab. Call Office For Appointment. [phone]  6.  Remove Staples Post-Op Day 21, and Remove Dressing Post-Op Day 10, with Daily Dressing Changes as Need.  7.  Ice/Elevate the leg you had surgery on as much as possible.  8.  Keep Dressing Clean and dry.   1.  Pain Control with Oxycodone as needed. A prescription has been sent to your pharmacy. Please try over- the-counter medications, such as tylenol or ibuprofen, as this may resolve your pain without the need for narcotic medication (Oxycodone).  2.  Walking with full weight bearing as tolerated, with assistive devices (walker/Cane as Needed)  3.  Blood clot prevention with Xarelto 10mg daily. Please follow up with your Cardiologist in 1-2 weeks for re-evalution of Xarelto dose.    4.  Physical Therapy as needed  5.  Follow up with Dr. Sanchez as Outpatient in 10-14 Days after Discharge from the Hospital or Rehab. Call Office For Appointment. [phone]  6.  Remove Staples Post-Op Day 21, and Remove Dressing Post-Op Day 10, with Daily Dressing Changes as Need.  7.  Ice/Elevate the leg you had surgery on as much as possible.  8.  Keep Dressing Clean and dry.

## 2023-10-20 ENCOUNTER — APPOINTMENT (OUTPATIENT)
Dept: ORTHOPEDIC SURGERY | Facility: CLINIC | Age: 68
End: 2023-10-20

## 2023-12-01 ENCOUNTER — APPOINTMENT (OUTPATIENT)
Dept: ORTHOPEDIC SURGERY | Facility: CLINIC | Age: 68
End: 2023-12-01
Payer: MEDICARE

## 2023-12-01 VITALS — WEIGHT: 212 LBS | BODY MASS INDEX: 39.01 KG/M2 | HEIGHT: 62 IN

## 2023-12-01 DIAGNOSIS — M17.12 UNILATERAL PRIMARY OSTEOARTHRITIS, LEFT KNEE: ICD-10-CM

## 2023-12-01 DIAGNOSIS — Z96.652 PRESENCE OF LEFT ARTIFICIAL KNEE JOINT: ICD-10-CM

## 2023-12-01 PROCEDURE — 73562 X-RAY EXAM OF KNEE 3: CPT | Mod: LT

## 2023-12-01 PROCEDURE — 99213 OFFICE O/P EST LOW 20 MIN: CPT

## 2023-12-12 ENCOUNTER — APPOINTMENT (OUTPATIENT)
Dept: ORTHOPEDIC SURGERY | Facility: CLINIC | Age: 68
End: 2023-12-12
Payer: MEDICARE

## 2023-12-12 VITALS — BODY MASS INDEX: 39.2 KG/M2 | WEIGHT: 213 LBS | HEIGHT: 62 IN

## 2023-12-12 DIAGNOSIS — M16.11 UNILATERAL PRIMARY OSTEOARTHRITIS, RIGHT HIP: ICD-10-CM

## 2023-12-12 DIAGNOSIS — M25.551 PAIN IN RIGHT HIP: ICD-10-CM

## 2023-12-12 DIAGNOSIS — G89.29 PAIN IN RIGHT HIP: ICD-10-CM

## 2023-12-12 PROCEDURE — 73502 X-RAY EXAM HIP UNI 2-3 VIEWS: CPT

## 2023-12-20 ENCOUNTER — OUTPATIENT (OUTPATIENT)
Dept: OUTPATIENT SERVICES | Facility: HOSPITAL | Age: 68
LOS: 1 days | End: 2023-12-20
Payer: COMMERCIAL

## 2023-12-20 ENCOUNTER — APPOINTMENT (OUTPATIENT)
Dept: ULTRASOUND IMAGING | Facility: CLINIC | Age: 68
End: 2023-12-20
Payer: MEDICARE

## 2023-12-20 DIAGNOSIS — M16.11 UNILATERAL PRIMARY OSTEOARTHRITIS, RIGHT HIP: ICD-10-CM

## 2023-12-20 PROCEDURE — 20611 DRAIN/INJ JOINT/BURSA W/US: CPT | Mod: RT

## 2023-12-20 PROCEDURE — 20611 DRAIN/INJ JOINT/BURSA W/US: CPT

## 2024-06-11 DIAGNOSIS — M25.561 PAIN IN RIGHT KNEE: ICD-10-CM

## 2024-06-19 ENCOUNTER — APPOINTMENT (OUTPATIENT)
Dept: ORTHOPEDIC SURGERY | Facility: CLINIC | Age: 69
End: 2024-06-19
Payer: MEDICARE

## 2024-06-19 VITALS — HEIGHT: 62 IN | BODY MASS INDEX: 40.12 KG/M2 | WEIGHT: 218 LBS

## 2024-06-19 DIAGNOSIS — M17.11 UNILATERAL PRIMARY OSTEOARTHRITIS, RIGHT KNEE: ICD-10-CM

## 2024-06-19 DIAGNOSIS — M21.161 VARUS DEFORMITY, NOT ELSEWHERE CLASSIFIED, RIGHT KNEE: ICD-10-CM

## 2024-06-19 PROCEDURE — 20610 DRAIN/INJ JOINT/BURSA W/O US: CPT | Mod: RT

## 2024-06-19 PROCEDURE — 73564 X-RAY EXAM KNEE 4 OR MORE: CPT | Mod: RT

## 2024-06-19 PROCEDURE — 99213 OFFICE O/P EST LOW 20 MIN: CPT | Mod: 25

## 2024-06-19 NOTE — CONSULT LETTER
[Dear  ___] : Dear  [unfilled], [Consult Letter:] : I had the pleasure of evaluating your patient, [unfilled]. [Please see my note below.] : Please see my note below. [Consult Closing:] : Thank you very much for allowing me to participate in the care of this patient.  If you have any questions, please do not hesitate to contact me. [Sincerely,] : Sincerely, [FreeTextEntry2] : MARTIN MAX

## 2024-06-19 NOTE — PROCEDURE
[de-identified] : RIGHT KNEE CORTISONE INJECTION Discussed at length with the patient the planned steroid and lidocaine injection. The risks, benefits, convalescence and alternatives were reviewed. The possible side effects discussed included but were not limited to: pain, swelling, heat and redness. These symptoms are generally mild but if they are extensive then contact the office. Giving pain relievers by mouth such as NSAIDs or Tylenol can generally treat the reactions to steroid and lidocaine. Rare cases of infection have been noted. Rash, hives and itching may occur post injection. If you have muscle pain or cramps, flushing and or swelling of the face, rapid heart beat, nausea, dizziness, fever, chills, headache, difficulty breathing, swelling in the arms or legs, or have a prickly feeling of your skin, contact a health care provider immediately.  Following this discussion, the knee was prepped with betadine and under sterile conditions 5 cc of 2% lidocaine and 1 cc depo-medrol (40mg) were injected with a 21 gauge needle. The needle was introduced into the joint, aspiration was performed to ensure intra-articular placement and the medication was injected. Upon withdrawal of the needle the site was cleaned with alcohol and a bandaid applied. The patient tolerated the injection well and there were no adverse effects. Post injection instructions included no strenuous activity for 24 hours, cryotherapy and if there are any adverse effects to contact the office.

## 2024-06-19 NOTE — HISTORY OF PRESENT ILLNESS
[de-identified] : SHIRLENE SORIANO, 68 year old female, presents for evaluation of right knee symptoms.She states overall her knee is doing okay but for the last couple months she has been having increased pain in thigh area with swelling. She denies any injuries. Patient notes the pain is dull and achy with mild swelling and LOM. She denies any mechanical symptoms. She states her walking is limited and walks holding the railing and takes one step at a time. She takes Tylenol as needed. PMHx of A-fib on Xarelto and HTN

## 2024-06-19 NOTE — REASON FOR VISIT
[Follow-Up Visit] : a follow-up visit for [Knee Pain] : knee pain [Total Knee Replacement Surgery, Aftercare] : total knee replacement surgery, aftercare

## 2024-06-19 NOTE — ADDENDUM
[FreeTextEntry1] : This note was written by Debbie Vela on 06/19/2024 acting as scribe for Dr. Mekhi Sanchez M.D.   I, Dr. Mekhi Sanchez, have read and attest that all the information, medical decision making and discharge instructions within are true and accurate.

## 2024-06-19 NOTE — PHYSICAL EXAM
[de-identified] : General appearance: well-nourished and hydrated, pleasant, alert and oriented x 3, cooperative. HEENT: Normocephalic, EOM intact, Nasal septum midline, Oral cavity clear, External auditory canal clear. Cardiovascular: no apparent abnormalities, no lower leg edema, no varicosities, pedal pulses are palpable. Lymphatics Lymph nodes: none palpated, Lymphedema: not present. Neurologic: sensation is normal, no muscle weakness in upper or lower extremities, patella tendon reflexes intact. Dermatologic no apparent skin lesions, moist, warm, no rash. Spine: cervical spine appears normal and moves freely, thoracic spine appears normal and moves freely, lumbosacral spine appears normal and moves freely. Gait: nonantalgic.		  Right Knee Inspection: trace effusion Wounds: none. Alignment: 10 varus deformity. Palpation: medial joint line tenderness on palpation.  ROM: Active (in degrees): 0-90 with crepitus Ligamentous laxity (neg): negative ant. drawer test, negative post. drawer test, stable to varus stress test, stable to valgus stress test, Patellofemoral Alignment Test: Q angle-, normal. Muscle Test: good quad strength. Leg examination: Tenderness of proximal thigh muscles  [de-identified] : Right knee x-rays, standing AP/Lateral and Merchant films, and 45-degree PA standing view, taken at the office today shows diffuse tricompartmental degenerative arthritis, varus deformity, lateral tibial subluxation, patella baja, significant osteophytes, patella at appropriate height and central position, Kellgren and Javier grade 4.  Left knee x-ray merchant view taken at the office today demonstrates a total knee replacement with the patella in a central position

## 2024-06-19 NOTE — DISCUSSION/SUMMARY
[de-identified] : Discussed at length the nature of the patient's condition. Their Right knee symptoms appear secondary to degenerative arthritis with varus deformity. We reviewed operative and nonoperative treatment. While I believe she would ultimately need a TKR, she wants to continue a non-operative approach. Therefore, we will continue nonoperatively. We will seek authorization for right knee viscosupplementation injections. Once we receive authorization, we will proceed accordingly. In the interim, patient was given a right knee cortisone injection today as detailed above for symptomatic relief. I also suggested PT, weight loss, and Tylenol prn as she is unable to take NSAIDs due to being on Xarelto. They can continue activities as tolerated. She will continue to follow-up with Dr. Briseno for her hip.

## 2024-12-06 ENCOUNTER — APPOINTMENT (OUTPATIENT)
Dept: ORTHOPEDIC SURGERY | Facility: CLINIC | Age: 69
End: 2024-12-06

## 2024-12-06 DIAGNOSIS — M17.11 UNILATERAL PRIMARY OSTEOARTHRITIS, RIGHT KNEE: ICD-10-CM

## 2024-12-06 PROCEDURE — 20610 DRAIN/INJ JOINT/BURSA W/O US: CPT | Mod: RT

## 2024-12-13 ENCOUNTER — APPOINTMENT (OUTPATIENT)
Dept: ORTHOPEDIC SURGERY | Facility: CLINIC | Age: 69
End: 2024-12-13

## 2024-12-20 ENCOUNTER — APPOINTMENT (OUTPATIENT)
Dept: ORTHOPEDIC SURGERY | Facility: CLINIC | Age: 69
End: 2024-12-20
Payer: MEDICARE

## 2024-12-20 DIAGNOSIS — M17.11 UNILATERAL PRIMARY OSTEOARTHRITIS, RIGHT KNEE: ICD-10-CM

## 2024-12-20 PROCEDURE — 20610 DRAIN/INJ JOINT/BURSA W/O US: CPT | Mod: RT

## 2025-01-03 ENCOUNTER — APPOINTMENT (OUTPATIENT)
Dept: ORTHOPEDIC SURGERY | Facility: CLINIC | Age: 70
End: 2025-01-03
Payer: MEDICARE

## 2025-01-03 DIAGNOSIS — M17.11 UNILATERAL PRIMARY OSTEOARTHRITIS, RIGHT KNEE: ICD-10-CM

## 2025-01-03 PROCEDURE — 20610 DRAIN/INJ JOINT/BURSA W/O US: CPT | Mod: RT

## 2025-02-06 ENCOUNTER — APPOINTMENT (OUTPATIENT)
Dept: ORTHOPEDIC SURGERY | Facility: CLINIC | Age: 70
End: 2025-02-06

## 2025-04-11 ENCOUNTER — APPOINTMENT (OUTPATIENT)
Dept: ORTHOPEDIC SURGERY | Facility: CLINIC | Age: 70
End: 2025-04-11
Payer: MEDICARE

## 2025-04-11 VITALS — BODY MASS INDEX: 40.12 KG/M2 | WEIGHT: 218 LBS | HEIGHT: 62 IN

## 2025-04-11 DIAGNOSIS — Z96.652 PRESENCE OF LEFT ARTIFICIAL KNEE JOINT: ICD-10-CM

## 2025-04-11 DIAGNOSIS — M17.0 BILATERAL PRIMARY OSTEOARTHRITIS OF KNEE: ICD-10-CM

## 2025-04-11 DIAGNOSIS — M21.161 VARUS DEFORMITY, NOT ELSEWHERE CLASSIFIED, RIGHT KNEE: ICD-10-CM

## 2025-04-11 PROCEDURE — 73564 X-RAY EXAM KNEE 4 OR MORE: CPT | Mod: RT

## 2025-04-11 PROCEDURE — 73562 X-RAY EXAM OF KNEE 3: CPT | Mod: LT

## 2025-04-11 PROCEDURE — 99215 OFFICE O/P EST HI 40 MIN: CPT

## 2025-04-11 PROCEDURE — G2211 COMPLEX E/M VISIT ADD ON: CPT

## 2025-05-06 ENCOUNTER — NON-APPOINTMENT (OUTPATIENT)
Age: 70
End: 2025-05-06

## 2025-06-06 ENCOUNTER — OUTPATIENT (OUTPATIENT)
Dept: OUTPATIENT SERVICES | Facility: HOSPITAL | Age: 70
LOS: 1 days | End: 2025-06-06
Payer: MEDICARE

## 2025-06-06 VITALS
SYSTOLIC BLOOD PRESSURE: 116 MMHG | OXYGEN SATURATION: 99 % | TEMPERATURE: 98 F | HEART RATE: 66 BPM | RESPIRATION RATE: 18 BRPM | HEIGHT: 62 IN | WEIGHT: 218.04 LBS | DIASTOLIC BLOOD PRESSURE: 69 MMHG

## 2025-06-06 DIAGNOSIS — M17.11 UNILATERAL PRIMARY OSTEOARTHRITIS, RIGHT KNEE: ICD-10-CM

## 2025-06-06 DIAGNOSIS — Z96.652 PRESENCE OF LEFT ARTIFICIAL KNEE JOINT: Chronic | ICD-10-CM

## 2025-06-06 DIAGNOSIS — I48.91 UNSPECIFIED ATRIAL FIBRILLATION: ICD-10-CM

## 2025-06-06 DIAGNOSIS — I10 ESSENTIAL (PRIMARY) HYPERTENSION: ICD-10-CM

## 2025-06-06 DIAGNOSIS — M21.161 VARUS DEFORMITY, NOT ELSEWHERE CLASSIFIED, RIGHT KNEE: ICD-10-CM

## 2025-06-06 DIAGNOSIS — Z01.818 ENCOUNTER FOR OTHER PREPROCEDURAL EXAMINATION: ICD-10-CM

## 2025-06-06 LAB
ALBUMIN SERPL ELPH-MCNC: 3.6 G/DL — SIGNIFICANT CHANGE UP (ref 3.3–5)
ALP SERPL-CCNC: 117 U/L — SIGNIFICANT CHANGE UP (ref 40–120)
ALT FLD-CCNC: 19 U/L — SIGNIFICANT CHANGE UP (ref 12–78)
ANION GAP SERPL CALC-SCNC: 5 MMOL/L — SIGNIFICANT CHANGE UP (ref 5–17)
APPEARANCE UR: CLEAR — SIGNIFICANT CHANGE UP
APTT BLD: 42.8 SEC — HIGH (ref 26.1–36.8)
AST SERPL-CCNC: 10 U/L — LOW (ref 15–37)
BASOPHILS # BLD AUTO: 0.02 K/UL — SIGNIFICANT CHANGE UP (ref 0–0.2)
BASOPHILS NFR BLD AUTO: 0.4 % — SIGNIFICANT CHANGE UP (ref 0–2)
BILIRUB SERPL-MCNC: 1.1 MG/DL — SIGNIFICANT CHANGE UP (ref 0.2–1.2)
BILIRUB UR-MCNC: NEGATIVE — SIGNIFICANT CHANGE UP
BLD GP AB SCN SERPL QL: SIGNIFICANT CHANGE UP
BUN SERPL-MCNC: 10 MG/DL — SIGNIFICANT CHANGE UP (ref 7–23)
CALCIUM SERPL-MCNC: 9.1 MG/DL — SIGNIFICANT CHANGE UP (ref 8.5–10.1)
CHLORIDE SERPL-SCNC: 107 MMOL/L — SIGNIFICANT CHANGE UP (ref 96–108)
CO2 SERPL-SCNC: 29 MMOL/L — SIGNIFICANT CHANGE UP (ref 22–31)
COLOR SPEC: YELLOW — SIGNIFICANT CHANGE UP
CREAT SERPL-MCNC: 0.79 MG/DL — SIGNIFICANT CHANGE UP (ref 0.5–1.3)
DIFF PNL FLD: NEGATIVE — SIGNIFICANT CHANGE UP
EGFR: 82 ML/MIN/1.73M2 — SIGNIFICANT CHANGE UP
EGFR: 82 ML/MIN/1.73M2 — SIGNIFICANT CHANGE UP
EOSINOPHIL # BLD AUTO: 0.08 K/UL — SIGNIFICANT CHANGE UP (ref 0–0.5)
EOSINOPHIL NFR BLD AUTO: 1.6 % — SIGNIFICANT CHANGE UP (ref 0–6)
GLUCOSE SERPL-MCNC: 79 MG/DL — SIGNIFICANT CHANGE UP (ref 70–99)
GLUCOSE UR QL: NEGATIVE MG/DL — SIGNIFICANT CHANGE UP
HCT VFR BLD CALC: 40.1 % — SIGNIFICANT CHANGE UP (ref 34.5–45)
HGB BLD-MCNC: 12.9 G/DL — SIGNIFICANT CHANGE UP (ref 11.5–15.5)
IMM GRANULOCYTES NFR BLD AUTO: 0.2 % — SIGNIFICANT CHANGE UP (ref 0–0.9)
INR BLD: 2.51 RATIO — HIGH (ref 0.85–1.16)
KETONES UR QL: NEGATIVE MG/DL — SIGNIFICANT CHANGE UP
LEUKOCYTE ESTERASE UR-ACNC: NEGATIVE — SIGNIFICANT CHANGE UP
LYMPHOCYTES # BLD AUTO: 1.9 K/UL — SIGNIFICANT CHANGE UP (ref 1–3.3)
LYMPHOCYTES # BLD AUTO: 38.9 % — SIGNIFICANT CHANGE UP (ref 13–44)
MCHC RBC-ENTMCNC: 29.1 PG — SIGNIFICANT CHANGE UP (ref 27–34)
MCHC RBC-ENTMCNC: 32.2 G/DL — SIGNIFICANT CHANGE UP (ref 32–36)
MCV RBC AUTO: 90.5 FL — SIGNIFICANT CHANGE UP (ref 80–100)
MONOCYTES # BLD AUTO: 0.35 K/UL — SIGNIFICANT CHANGE UP (ref 0–0.9)
MONOCYTES NFR BLD AUTO: 7.2 % — SIGNIFICANT CHANGE UP (ref 2–14)
NEUTROPHILS # BLD AUTO: 2.52 K/UL — SIGNIFICANT CHANGE UP (ref 1.8–7.4)
NEUTROPHILS NFR BLD AUTO: 51.7 % — SIGNIFICANT CHANGE UP (ref 43–77)
NITRITE UR-MCNC: NEGATIVE — SIGNIFICANT CHANGE UP
NRBC BLD AUTO-RTO: 0 /100 WBCS — SIGNIFICANT CHANGE UP (ref 0–0)
PH UR: 5.5 — SIGNIFICANT CHANGE UP (ref 5–8)
PLATELET # BLD AUTO: 237 K/UL — SIGNIFICANT CHANGE UP (ref 150–400)
POTASSIUM SERPL-MCNC: 3.4 MMOL/L — LOW (ref 3.5–5.3)
POTASSIUM SERPL-SCNC: 3.4 MMOL/L — LOW (ref 3.5–5.3)
PROT SERPL-MCNC: 8 GM/DL — SIGNIFICANT CHANGE UP (ref 6–8.3)
PROT UR-MCNC: NEGATIVE MG/DL — SIGNIFICANT CHANGE UP
PROTHROM AB SERPL-ACNC: 28.1 SEC — HIGH (ref 9.9–13.4)
RBC # BLD: 4.43 M/UL — SIGNIFICANT CHANGE UP (ref 3.8–5.2)
RBC # FLD: 13.3 % — SIGNIFICANT CHANGE UP (ref 10.3–14.5)
SODIUM SERPL-SCNC: 141 MMOL/L — SIGNIFICANT CHANGE UP (ref 135–145)
SP GR SPEC: 1.02 — SIGNIFICANT CHANGE UP (ref 1–1.03)
UROBILINOGEN FLD QL: 1 MG/DL — SIGNIFICANT CHANGE UP (ref 0.2–1)
WBC # BLD: 4.88 K/UL — SIGNIFICANT CHANGE UP (ref 3.8–10.5)
WBC # FLD AUTO: 4.88 K/UL — SIGNIFICANT CHANGE UP (ref 3.8–10.5)

## 2025-06-06 PROCEDURE — 93010 ELECTROCARDIOGRAM REPORT: CPT

## 2025-06-06 NOTE — H&P PST ADULT - PROBLEM/PLAN-2
Anesthesia Evaluation     Patient summary reviewed and Nursing notes reviewed                Airway   Mallampati: I  TM distance: >3 FB  Neck ROM: full  No difficulty expected  Dental - normal exam     Pulmonary - normal exam   (+) a smoker Former Abstained day of surgery, COPD, asthma,sleep apnea,   Cardiovascular - normal exam    (+) hypertension,       Neuro/Psych  (+) psychiatric history,    GI/Hepatic/Renal/Endo    (+) obesity, morbid obesity, GERD,  liver disease, diabetes mellitus,     Musculoskeletal     Abdominal  - normal exam    Bowel sounds: normal.   Substance History - negative use     OB/GYN negative ob/gyn ROS         Other   arthritis,                      Anesthesia Plan    ASA 4     MAC       Anesthetic plan, all risks, benefits, and alternatives have been provided, discussed and informed consent has been obtained with: patient.        CODE STATUS:        DISPLAY PLAN FREE TEXT

## 2025-06-06 NOTE — OCCUPATIONAL THERAPY INITIAL EVALUATION ADULT - PERTINENT HX OF CURRENT PROBLEM, REHAB EVAL
R knee OA which impacts pts ability to perform functional tasks/transfers and mobility. Pt is scheduled for R TKR on 6/26/25.

## 2025-06-06 NOTE — H&P PST ADULT - PROBLEM SELECTOR PLAN 1
right total knee replacement.  labs - cbc,pt/ptt,bmp,t&s,nose cx,ekg  Medical and cardiac clearance required  preop 3 day Hibiclens instruction reviewed and given .instructed on if  nose cx positive use mupirocin 5 days and checklist given  take routine meds DOS with sips of water. avoid NSAID and OTC supplements. verbalized understanding  information on proper nutrition , increase protein and better food choices provided in packet   anesthesiologist to review PST labs, EKG, medical clearance and optimization for surgery

## 2025-06-06 NOTE — OCCUPATIONAL THERAPY INITIAL EVALUATION ADULT - PRECAUTIONS/LIMITATIONS, REHAB EVAL
no known precautions/limitations Scc Moderately Differentiated Histology Text: There were aggregates of moderately-differentiated squamous cells.

## 2025-06-06 NOTE — H&P PST ADULT - HISTORY OF PRESENT ILLNESS
69 year old female with a medical history of afib, HTN, HLD, and insomia c/o pain and limited ROM to right knee     Goal: to walk and dance without pain  69 year old female with a medical history of Afib HTN, HLD, and insomnia c/o pain and limited ROM to right knee secondary to osteoarthritis. She is scheduled for a right total knee replacement on 6/26/2025  Goal: to walk and dance without pain

## 2025-06-06 NOTE — OCCUPATIONAL THERAPY INITIAL EVALUATION ADULT - ADDITIONAL COMMENTS
Pt lives with son (Cant assist post op) in a private house with 3 steps to enter with a L handrail. Once inside, the pt has 13 steps with a R handrail to reach the main floor where the bedroom and bathroom is. The pt bathroom has a tub/shower combination, fixed/retractable shower head, standard toilet seat and no grab bars. The pt has a raised toilet seat with arms at home. The pt ambulates with no device and does not own any device for ambulation. The pt daily pain is a 0/10 at rest and a 8/10 with movement. The pt manages the pain with rest and Tylenol prn. The pt has no recent outpatient PT, no recent falls and has no buckling of the knees. The pt wears glasses for distance, R handed, drives and has no hearing impairments.

## 2025-06-06 NOTE — H&P PST ADULT - CONSTITUTIONAL
normal/well-groomed/no distress Mercedes Flap Text: The defect edges were debeveled with a #15 scalpel blade.  Given the location of the defect, shape of the defect and the proximity to free margins a Mercedes flap was deemed most appropriate.  Using a sterile surgical marker, an appropriate advancement flap was drawn incorporating the defect and placing the expected incisions within the relaxed skin tension lines where possible. The area thus outlined was incised deep to adipose tissue with a #15 scalpel blade.  The skin margins were undermined to an appropriate distance in all directions utilizing iris scissors.

## 2025-06-06 NOTE — H&P PST ADULT - NSICDXPASTSURGICALHX_GEN_ALL_CORE_FT
PAST SURGICAL HISTORY:  H/O total knee replacement, left     H/O:  age 36    History of appendectomy age 12

## 2025-06-06 NOTE — H&P PST ADULT - PROBLEM SELECTOR PLAN 2
right total knee replacement.  labs - cbc,pt/ptt,bmp,t&s,nose cx,ekg  Medical and cardiac clearance required  preop 3 day Hibiclens instruction reviewed and given .instructed on if  nose cx positive use mupirocin 5 days and checklist given  take routine meds DOS with sips of water. avoid NSAID and OTC supplements. verbalized understanding  information on proper nutrition , increase protein and better food choices provided in packet   anesthesiologist to review PST labs, EKG, medical and cardiac clearance and optimization for surgery

## 2025-06-06 NOTE — H&P PST ADULT - ASSESSMENT
69 year old female with a medical history of Afib HTN, HLD, and insomnia c/o pain and limited ROM to right knee secondary to osteoarthritis. She is scheduled for a right total knee replacement.    CAPRINI SCORE [CLOT]    AGE RELATED RISK FACTORS                                                       MOBILITY RELATED FACTORS  [ ] Age 41-60 years                                            (1 Point)                  [ ] Bed rest                                                        (1 Point)  [ x] Age: 61-74 years                                           (2 Points)                 [ ] Plaster cast                                                   (2 Points)  [ ] Age= 75 years                                              (3 Points)                 [ ] Bed bound for more than 72 hours                 (2 Points)    DISEASE RELATED RISK FACTORS                                               GENDER SPECIFIC FACTORS  [ ] Edema in the lower extremities                       (1 Point)                  [ ] Pregnancy                                                     (1 Point)  [ ] Varicose veins                                               (1 Point)                  [ ] Post-partum < 6 weeks                                   (1 Point)             [ x] BMI > 25 Kg/m2                                            (1 Point)                  [ ] Hormonal therapy  or oral contraception          (1 Point)                 [ ] Sepsis (in the previous month)                        (1 Point)                  [ ] History of pregnancy complications                 (1 point)  [ ] Pneumonia or serious lung disease                                               [ ] Unexplained or recurrent                     (1 Point)           (in the previous month)                               (1 Point)  [ ] Abnormal pulmonary function test                     (1 Point)                 SURGERY RELATED RISK FACTORS  [ ] Acute myocardial infarction                              (1 Point)                 [ ]  Section                                             (1 Point)  [ ] Congestive heart failure (in the previous month)  (1 Point)               [ ] Minor surgery                                                  (1 Point)   [ ] Inflammatory bowel disease                             (1 Point)                 [ ] Arthroscopic surgery                                        (2 Points)  [ ] Central venous access                                      (2 Points)                [ ] General surgery lasting more than 45 minutes   (2 Points)       [ ] Stroke (in the previous month)                          (5 Points)               [x ] Elective arthroplasty                                         (5 Points)                                                                                                                                               HEMATOLOGY RELATED FACTORS                                                 TRAUMA RELATED RISK FACTORS  [ ] Prior episodes of VTE                                     (3 Points)                [ ] Fracture of the hip, pelvis, or leg                       (5 Points)  [ ] Positive family history for VTE                         (3 Points)                 [ ] Acute spinal cord injury (in the previous month)  (5 Points)  [ ] Prothrombin 36387 A                                     (3 Points)                 [ ] Paralysis  (less than 1 month)                             (5 Points)  [ ] Factor V Leiden                                             (3 Points)                  [ ] Multiple Trauma within 1 month                        (5 Points)  [ ] Lupus anticoagulants                                     (3 Points)                                                           [ ] Anticardiolipin antibodies                               (3 Points)                                                       [ ] High homocysteine in the blood                      (3 Points)                                             [ ] Other congenital or acquired thrombophilia      (3 Points)                                                [ ] Heparin induced thrombocytopenia                  (3 Points)                                          Total Score [       8   ]    Caprini Score 0 - 2:  Low Risk, No VTE Prophylaxis required for most patients, encourage ambulation  Caprini Score 3 - 6:  At Risk, pharmacologic VTE prophylaxis is indicated for most patients (in the absence of a contraindication)  Caprini Score Greater than or = 7:  High Risk, pharmacologic VTE prophylaxis is indicated for most patients (in the absence of a contraindication)    Caprini score indicates that the patient is high risk for VTE event ( score 6 or greater). Surgical patient's in this group will benefit from both pharmacologic prophylaxis and intermittent compression devices . Surgical team will determine the balance between VTE  risk and bleeding risk and other clinical considerations

## 2025-06-07 LAB
A1C WITH ESTIMATED AVERAGE GLUCOSE RESULT: 5.7 % — HIGH (ref 4–5.6)
CULTURE RESULTS: SIGNIFICANT CHANGE UP
ESTIMATED AVERAGE GLUCOSE: 117 MG/DL — HIGH (ref 68–114)
MRSA PCR RESULT.: SIGNIFICANT CHANGE UP
S AUREUS DNA NOSE QL NAA+PROBE: SIGNIFICANT CHANGE UP
SPECIMEN SOURCE: SIGNIFICANT CHANGE UP
VIT D25+D1,25 OH+D1,25 PNL SERPL-MCNC: 67.6 PG/ML — SIGNIFICANT CHANGE UP (ref 19.9–79.3)

## 2025-06-10 RX ORDER — SODIUM CHLORIDE 9 G/1000ML
1000 INJECTION, SOLUTION INTRAVENOUS
Refills: 0 | Status: DISCONTINUED | OUTPATIENT
Start: 2025-06-26 | End: 2025-06-27

## 2025-06-10 RX ORDER — B1/B2/B3/B5/B6/B12/VIT C/FOLIC 500-0.5 MG
1 TABLET ORAL DAILY
Refills: 0 | Status: DISCONTINUED | OUTPATIENT
Start: 2025-06-26 | End: 2025-06-27

## 2025-06-10 RX ORDER — POLYETHYLENE GLYCOL 3350 17 G/17G
17 POWDER, FOR SOLUTION ORAL AT BEDTIME
Refills: 0 | Status: DISCONTINUED | OUTPATIENT
Start: 2025-06-26 | End: 2025-06-27

## 2025-06-10 RX ORDER — TRAMADOL HYDROCHLORIDE 50 MG/1
50 TABLET, FILM COATED ORAL EVERY 6 HOURS
Refills: 0 | Status: DISCONTINUED | OUTPATIENT
Start: 2025-06-26 | End: 2025-06-27

## 2025-06-10 RX ORDER — CELECOXIB 50 MG/1
200 CAPSULE ORAL EVERY 12 HOURS
Refills: 0 | Status: DISCONTINUED | OUTPATIENT
Start: 2025-06-26 | End: 2025-06-27

## 2025-06-10 RX ORDER — SENNA 187 MG
2 TABLET ORAL AT BEDTIME
Refills: 0 | Status: DISCONTINUED | OUTPATIENT
Start: 2025-06-26 | End: 2025-06-27

## 2025-06-10 RX ORDER — HYDROMORPHONE/SOD CHLOR,ISO/PF 2 MG/10 ML
0.5 SYRINGE (ML) INJECTION
Refills: 0 | Status: DISCONTINUED | OUTPATIENT
Start: 2025-06-26 | End: 2025-06-27

## 2025-06-10 RX ORDER — AMLODIPINE BESYLATE 10 MG/1
5 TABLET ORAL DAILY
Refills: 0 | Status: DISCONTINUED | OUTPATIENT
Start: 2025-06-26 | End: 2025-06-27

## 2025-06-10 RX ORDER — FOLIC ACID 1 MG/1
1 TABLET ORAL DAILY
Refills: 0 | Status: DISCONTINUED | OUTPATIENT
Start: 2025-06-26 | End: 2025-06-27

## 2025-06-10 RX ORDER — TRAZODONE HCL 100 MG
50 TABLET ORAL DAILY
Refills: 0 | Status: DISCONTINUED | OUTPATIENT
Start: 2025-06-26 | End: 2025-06-27

## 2025-06-10 RX ORDER — OXYCODONE HYDROCHLORIDE 30 MG/1
5 TABLET ORAL
Refills: 0 | Status: DISCONTINUED | OUTPATIENT
Start: 2025-06-26 | End: 2025-06-27

## 2025-06-10 RX ORDER — METOPROLOL SUCCINATE 50 MG/1
100 TABLET, EXTENDED RELEASE ORAL DAILY
Refills: 0 | Status: DISCONTINUED | OUTPATIENT
Start: 2025-06-26 | End: 2025-06-27

## 2025-06-10 RX ORDER — OXYCODONE HYDROCHLORIDE 30 MG/1
10 TABLET ORAL
Refills: 0 | Status: DISCONTINUED | OUTPATIENT
Start: 2025-06-26 | End: 2025-06-27

## 2025-06-10 RX ORDER — ACETAMINOPHEN 500 MG/5ML
1000 LIQUID (ML) ORAL EVERY 8 HOURS
Refills: 0 | Status: DISCONTINUED | OUTPATIENT
Start: 2025-06-26 | End: 2025-06-27

## 2025-06-10 RX ORDER — ONDANSETRON HCL/PF 4 MG/2 ML
4 VIAL (ML) INJECTION EVERY 6 HOURS
Refills: 0 | Status: DISCONTINUED | OUTPATIENT
Start: 2025-06-26 | End: 2025-06-27

## 2025-06-10 RX ORDER — RIVAROXABAN 10 MG/1
10 TABLET, FILM COATED ORAL DAILY
Refills: 0 | Status: DISCONTINUED | OUTPATIENT
Start: 2025-06-27 | End: 2025-06-27

## 2025-06-10 RX ORDER — RIVAROXABAN 10 MG/1
1 TABLET, FILM COATED ORAL
Refills: 0 | DISCHARGE

## 2025-06-10 RX ORDER — MAGNESIUM, ALUMINUM HYDROXIDE 200-200 MG
30 TABLET,CHEWABLE ORAL
Refills: 0 | Status: DISCONTINUED | OUTPATIENT
Start: 2025-06-26 | End: 2025-06-27

## 2025-06-10 RX ORDER — MAGNESIUM HYDROXIDE 400 MG/5ML
30 SUSPENSION ORAL DAILY
Refills: 0 | Status: DISCONTINUED | OUTPATIENT
Start: 2025-06-26 | End: 2025-06-27

## 2025-06-10 RX ORDER — ATORVASTATIN CALCIUM 80 MG/1
20 TABLET, FILM COATED ORAL DAILY
Refills: 0 | Status: DISCONTINUED | OUTPATIENT
Start: 2025-06-26 | End: 2025-06-27

## 2025-06-26 ENCOUNTER — TRANSCRIPTION ENCOUNTER (OUTPATIENT)
Age: 70
End: 2025-06-26

## 2025-06-26 ENCOUNTER — APPOINTMENT (OUTPATIENT)
Dept: ORTHOPEDIC SURGERY | Facility: HOSPITAL | Age: 70
End: 2025-06-26

## 2025-06-26 ENCOUNTER — INPATIENT (INPATIENT)
Facility: HOSPITAL | Age: 70
LOS: 0 days | Discharge: HOME HEALTH | End: 2025-06-27
Attending: ORTHOPAEDIC SURGERY | Admitting: ORTHOPAEDIC SURGERY
Payer: MEDICARE

## 2025-06-26 ENCOUNTER — RESULT REVIEW (OUTPATIENT)
Age: 70
End: 2025-06-26

## 2025-06-26 VITALS
TEMPERATURE: 98 F | SYSTOLIC BLOOD PRESSURE: 103 MMHG | DIASTOLIC BLOOD PRESSURE: 71 MMHG | RESPIRATION RATE: 18 BRPM | HEIGHT: 62 IN | HEART RATE: 63 BPM | WEIGHT: 216.05 LBS | OXYGEN SATURATION: 97 %

## 2025-06-26 DIAGNOSIS — Z96.652 PRESENCE OF LEFT ARTIFICIAL KNEE JOINT: Chronic | ICD-10-CM

## 2025-06-26 LAB
ANION GAP SERPL CALC-SCNC: 6 MMOL/L — SIGNIFICANT CHANGE UP (ref 5–17)
APTT BLD: 26.2 SEC — SIGNIFICANT CHANGE UP (ref 26.1–36.8)
BUN SERPL-MCNC: 16 MG/DL — SIGNIFICANT CHANGE UP (ref 7–23)
CALCIUM SERPL-MCNC: 9 MG/DL — SIGNIFICANT CHANGE UP (ref 8.5–10.1)
CHLORIDE SERPL-SCNC: 113 MMOL/L — HIGH (ref 96–108)
CO2 SERPL-SCNC: 26 MMOL/L — SIGNIFICANT CHANGE UP (ref 22–31)
CREAT SERPL-MCNC: 0.79 MG/DL — SIGNIFICANT CHANGE UP (ref 0.5–1.3)
EGFR: 82 ML/MIN/1.73M2 — SIGNIFICANT CHANGE UP
EGFR: 82 ML/MIN/1.73M2 — SIGNIFICANT CHANGE UP
GLUCOSE BLDC GLUCOMTR-MCNC: 156 MG/DL — HIGH (ref 70–99)
GLUCOSE SERPL-MCNC: 124 MG/DL — HIGH (ref 70–99)
HCT VFR BLD CALC: 36.8 % — SIGNIFICANT CHANGE UP (ref 34.5–45)
HGB BLD-MCNC: 11.6 G/DL — SIGNIFICANT CHANGE UP (ref 11.5–15.5)
INR BLD: 1.07 RATIO — SIGNIFICANT CHANGE UP (ref 0.85–1.16)
MCHC RBC-ENTMCNC: 29.1 PG — SIGNIFICANT CHANGE UP (ref 27–34)
MCHC RBC-ENTMCNC: 31.5 G/DL — LOW (ref 32–36)
MCV RBC AUTO: 92.2 FL — SIGNIFICANT CHANGE UP (ref 80–100)
NRBC BLD AUTO-RTO: 0 /100 WBCS — SIGNIFICANT CHANGE UP (ref 0–0)
PLATELET # BLD AUTO: 199 K/UL — SIGNIFICANT CHANGE UP (ref 150–400)
POTASSIUM SERPL-MCNC: 3.3 MMOL/L — LOW (ref 3.5–5.3)
POTASSIUM SERPL-SCNC: 3.3 MMOL/L — LOW (ref 3.5–5.3)
PROTHROM AB SERPL-ACNC: 12.1 SEC — SIGNIFICANT CHANGE UP (ref 9.9–13.4)
RBC # BLD: 3.99 M/UL — SIGNIFICANT CHANGE UP (ref 3.8–5.2)
RBC # FLD: 13.4 % — SIGNIFICANT CHANGE UP (ref 10.3–14.5)
SODIUM SERPL-SCNC: 145 MMOL/L — SIGNIFICANT CHANGE UP (ref 135–145)
WBC # BLD: 6.51 K/UL — SIGNIFICANT CHANGE UP (ref 3.8–10.5)
WBC # FLD AUTO: 6.51 K/UL — SIGNIFICANT CHANGE UP (ref 3.8–10.5)

## 2025-06-26 PROCEDURE — 73560 X-RAY EXAM OF KNEE 1 OR 2: CPT | Mod: 26,RT

## 2025-06-26 PROCEDURE — 27447 TOTAL KNEE ARTHROPLASTY: CPT | Mod: RT

## 2025-06-26 DEVICE — ZIMMER/NEXGEN HEX HEAD SCREW 3.5MM: Type: IMPLANTABLE DEVICE | Site: RIGHT | Status: FUNCTIONAL

## 2025-06-26 DEVICE — ZIMMER/NEXGEN SMOOTH PIN 3.2X75MM: Type: IMPLANTABLE DEVICE | Site: RIGHT | Status: FUNCTIONAL

## 2025-06-26 DEVICE — SURF ART PERSONA RT 6-9 CD 14MM: Type: IMPLANTABLE DEVICE | Site: RIGHT | Status: FUNCTIONAL

## 2025-06-26 DEVICE — IMPLANTABLE DEVICE: Type: IMPLANTABLE DEVICE | Site: RIGHT | Status: FUNCTIONAL

## 2025-06-26 DEVICE — ZIMMER FEMALE HEX SCREW MAGNETIC 2.5MM X 25MM: Type: IMPLANTABLE DEVICE | Site: RIGHT | Status: FUNCTIONAL

## 2025-06-26 DEVICE — CEMENT PALACOS R: Type: IMPLANTABLE DEVICE | Site: RIGHT | Status: FUNCTIONAL

## 2025-06-26 DEVICE — PATELLA  ALL POLY VE 32MM: Type: IMPLANTABLE DEVICE | Site: RIGHT | Status: FUNCTIONAL

## 2025-06-26 DEVICE — STEM EXT PERSONA 14MM PLUS 30M: Type: IMPLANTABLE DEVICE | Site: RIGHT | Status: FUNCTIONAL

## 2025-06-26 RX ORDER — DOCUSATE SODIUM 100 MG
1 CAPSULE ORAL
Qty: 9 | Refills: 0
Start: 2025-06-26 | End: 2025-06-28

## 2025-06-26 RX ORDER — RIVAROXABAN 10 MG/1
1 TABLET, FILM COATED ORAL
Qty: 0 | Refills: 0 | DISCHARGE

## 2025-06-26 RX ORDER — HYDROMORPHONE/SOD CHLOR,ISO/PF 2 MG/10 ML
1 SYRINGE (ML) INJECTION
Refills: 0 | Status: DISCONTINUED | OUTPATIENT
Start: 2025-06-26 | End: 2025-06-27

## 2025-06-26 RX ORDER — RIVAROXABAN 10 MG/1
1 TABLET, FILM COATED ORAL
Qty: 7 | Refills: 0
Start: 2025-06-26 | End: 2025-07-02

## 2025-06-26 RX ORDER — ACETAMINOPHEN 500 MG/5ML
1000 LIQUID (ML) ORAL ONCE
Refills: 0 | Status: DISCONTINUED | OUTPATIENT
Start: 2025-06-26 | End: 2025-06-27

## 2025-06-26 RX ORDER — CELECOXIB 50 MG/1
200 CAPSULE ORAL ONCE
Refills: 0 | Status: COMPLETED | OUTPATIENT
Start: 2025-06-26 | End: 2025-06-26

## 2025-06-26 RX ORDER — HYDROMORPHONE/SOD CHLOR,ISO/PF 2 MG/10 ML
0.5 SYRINGE (ML) INJECTION
Refills: 0 | Status: DISCONTINUED | OUTPATIENT
Start: 2025-06-26 | End: 2025-06-27

## 2025-06-26 RX ORDER — CELECOXIB 50 MG/1
1 CAPSULE ORAL
Qty: 30 | Refills: 0
Start: 2025-06-26 | End: 2025-07-25

## 2025-06-26 RX ORDER — SODIUM CHLORIDE 9 G/1000ML
1000 INJECTION, SOLUTION INTRAVENOUS ONCE
Refills: 0 | Status: COMPLETED | OUTPATIENT
Start: 2025-06-26 | End: 2025-06-26

## 2025-06-26 RX ORDER — ONDANSETRON HCL/PF 4 MG/2 ML
1 VIAL (ML) INJECTION
Qty: 9 | Refills: 0
Start: 2025-06-26 | End: 2025-06-28

## 2025-06-26 RX ORDER — SODIUM CHLORIDE 9 G/1000ML
1000 INJECTION, SOLUTION INTRAVENOUS
Refills: 0 | Status: DISCONTINUED | OUTPATIENT
Start: 2025-06-26 | End: 2025-06-27

## 2025-06-26 RX ORDER — TRAZODONE HCL 100 MG
0 TABLET ORAL
Refills: 0 | DISCHARGE

## 2025-06-26 RX ORDER — OXYCODONE HYDROCHLORIDE 30 MG/1
1 TABLET ORAL
Qty: 30 | Refills: 0
Start: 2025-06-26 | End: 2025-06-30

## 2025-06-26 RX ORDER — CEFAZOLIN SODIUM IN 0.9 % NACL 3 G/100 ML
2000 INTRAVENOUS SOLUTION, PIGGYBACK (ML) INTRAVENOUS EVERY 8 HOURS
Refills: 0 | Status: COMPLETED | OUTPATIENT
Start: 2025-06-26 | End: 2025-06-27

## 2025-06-26 RX ORDER — MELATONIN 5 MG
3 TABLET ORAL AT BEDTIME
Refills: 0 | Status: DISCONTINUED | OUTPATIENT
Start: 2025-06-26 | End: 2025-06-27

## 2025-06-26 RX ORDER — ACETAMINOPHEN 500 MG/5ML
1000 LIQUID (ML) ORAL ONCE
Refills: 0 | Status: COMPLETED | OUTPATIENT
Start: 2025-06-26 | End: 2025-06-27

## 2025-06-26 RX ORDER — ACETAMINOPHEN 500 MG/5ML
650 LIQUID (ML) ORAL ONCE
Refills: 0 | Status: COMPLETED | OUTPATIENT
Start: 2025-06-26 | End: 2025-06-26

## 2025-06-26 RX ORDER — ONDANSETRON HCL/PF 4 MG/2 ML
4 VIAL (ML) INJECTION ONCE
Refills: 0 | Status: DISCONTINUED | OUTPATIENT
Start: 2025-06-26 | End: 2025-06-27

## 2025-06-26 RX ADMIN — CELECOXIB 200 MILLIGRAM(S): 50 CAPSULE ORAL at 18:17

## 2025-06-26 RX ADMIN — Medication 650 MILLIGRAM(S): at 10:19

## 2025-06-26 RX ADMIN — Medication 40 MILLIEQUIVALENT(S): at 17:17

## 2025-06-26 RX ADMIN — Medication 3 MILLIGRAM(S): at 21:57

## 2025-06-26 RX ADMIN — Medication 100 MILLIGRAM(S): at 20:24

## 2025-06-26 RX ADMIN — POLYETHYLENE GLYCOL 3350 17 GRAM(S): 17 POWDER, FOR SOLUTION ORAL at 21:57

## 2025-06-26 RX ADMIN — SODIUM CHLORIDE 125 MILLILITER(S): 9 INJECTION, SOLUTION INTRAVENOUS at 20:25

## 2025-06-26 RX ADMIN — CELECOXIB 200 MILLIGRAM(S): 50 CAPSULE ORAL at 17:17

## 2025-06-26 RX ADMIN — Medication 1000 MILLIGRAM(S): at 22:57

## 2025-06-26 RX ADMIN — SODIUM CHLORIDE 1000 MILLILITER(S): 9 INJECTION, SOLUTION INTRAVENOUS at 18:57

## 2025-06-26 RX ADMIN — Medication 1000 MILLIGRAM(S): at 21:57

## 2025-06-26 RX ADMIN — Medication 2 TABLET(S): at 21:57

## 2025-06-26 RX ADMIN — SODIUM CHLORIDE 125 MILLILITER(S): 9 INJECTION, SOLUTION INTRAVENOUS at 17:49

## 2025-06-26 RX ADMIN — CELECOXIB 200 MILLIGRAM(S): 50 CAPSULE ORAL at 10:19

## 2025-06-26 NOTE — PHYSICAL THERAPY INITIAL EVALUATION ADULT - RANGE OF MOTION EXAMINATION, REHAB EVAL
R knee flexion 75 degrees, extension - 10 degrees/Left LE ROM was WFL (within functional limits)/deficits as listed below

## 2025-06-26 NOTE — OCCUPATIONAL THERAPY INITIAL EVALUATION ADULT - ADDITIONAL COMMENTS
Pt lives with son (Cant assist post op) in a private house with 3 steps to enter with a L handrail. Once inside, the pt has 13 steps with a R handrail to reach the main floor where the bedroom and bathroom is. The pt bathroom has a tub/shower combination, fixed/retractable shower head, standard toilet seat and no grab bars.

## 2025-06-26 NOTE — PATIENT PROFILE ADULT - FALL HARM RISK - HARM RISK INTERVENTIONS
Assistance with ambulation/Assistance OOB with selected safe patient handling equipment/Communicate Risk of Fall with Harm to all staff/Discuss with provider need for PT consult/Monitor gait and stability/Provide patient with walking aids - walker, cane, crutches/Reinforce activity limits and safety measures with patient and family/Review medications for side effects contributing to fall risk/Sit up slowly, dangle for a short time, stand at bedside before walking/Tailored Fall Risk Interventions/Toileting schedule using arm’s reach rule for commode and bathroom/Use of alarms - bed, chair and/or voice tab/Visual Cue: Yellow wristband and red socks/Bed in lowest position, wheels locked, appropriate side rails in place/Call bell, personal items and telephone in reach/Instruct patient to call for assistance before getting out of bed or chair/Non-slip footwear when patient is out of bed/[place] to call system/Physically safe environment - no spills, clutter or unnecessary equipment/Purposeful Proactive Rounding/Room/bathroom lighting operational, light cord in reach

## 2025-06-26 NOTE — CONSULT NOTE ADULT - SUBJECTIVE AND OBJECTIVE BOX
Chief Complaint:  Patient is a 69y old  Female who presents with a chief complaint of right total knee replacement (26 Jun 2025 15:31)      HPI: Currently no SOB or chest pain or palpitation. No current cough or fever . No current nausea or vomiting. yet to void at the time of exam earlier today.       Review of Systems:    General:  No wt loss, fevers, chills, night sweats  Eyes:  Good vision, no reported pain  ENT:  No sore throat, pain, runny nose, dysphagia  CV:  No pain, palpitations, hypo/hypertension  Resp:  No dyspnea, cough, tachypnea, wheezing  GI:  No pain, nausea, vomiting, diarrhea, constipation  :  No pain, bleeding, incontinence, nocturia  Muscle:  No pain, weakness  Breast:  No pain, abscess, mass, discharge  Neuro:  No weakness, tingling, memory problems  Psych:  No fatigue, insomnia, mood problems, depression  Endocrine:  No polyuria, polydypsia, cold/heat intolerance  Heme:  No petechiae, ecchymosis, easy bruisability  Skin:  No rash, tattoos, scars, edema    Relevant Family History: NC. Allergy : PCN.      Relevant Social History: NC .     PMH : HTN. Afib. Meds : Norvasc. Diovan/HCTZ. Toprol XL . Xarelto .    Physical Exam:      Vital Signs:  Vital Signs Last 24 Hrs  T(C): 36.6 (26 Jun 2025 19:02), Max: 36.9 (26 Jun 2025 10:09)  T(F): 97.8 (26 Jun 2025 19:02), Max: 98.5 (26 Jun 2025 10:09)  HR: 69 (26 Jun 2025 19:00) (59 - 74)  BP: 105/66 (26 Jun 2025 19:00) (80/53 - 105/66)  BP(mean): --  RR: 17 (26 Jun 2025 19:00) (11 - 22)  SpO2: 97% (26 Jun 2025 19:00) (95% - 100%)    Parameters below as of 26 Jun 2025 19:00  Patient On (Oxygen Delivery Method): room air      Daily Height in cm: 157.48 (26 Jun 2025 10:09)    Daily   I&O's Summary      General:  Appears stated age, well-groomed, well-nourished, no distress  HEENT:  NC/AT, patent nares w/ pink mucosa, OP clear w/o lesions, PERRL, EOMI, conjunctivae clear, no thyromegaly, nodules, adenopathy, no JVD  Chest:  Full & symmetric excursion, no increased effort, breath sounds clear  Cardiovascular:  Regular rhythm, S1, S2, no murmur/rub/S3/S4, no carotid/femoral/abdominal bruit, radial/pedal pulses 2+, no edema  Abdomen:  Soft, non-tender, non-distended, normoactive bowel sounds, no HSM  Extremities: Slight fulness right leg. + Pulse.   Skin:  No rash/erythema/ecchymoses/petechiae/wounds/abscess/warm/dry  Musculoskeletal: Intact .  Neuro/Psych:  Alert, oriented, normal and symmetric strength in UEs, LEs .     Laboratory:                            11.6   6.51  )-----------( 199      ( 26 Jun 2025 15:01 )             36.8     06-26    145  |  113[H]  |  16  ----------------------------<  124[H]  3.3[L]   |  26  |  0.79    Ca    9.0      26 Jun 2025 15:01            CAPILLARY BLOOD GLUCOSE      POCT Blood Glucose.: 156 mg/dL (26 Jun 2025 10:10)      PT/INR - ( 26 Jun 2025 10:12 )   PT: 12.1 sec;   INR: 1.07 ratio         PTT - ( 26 Jun 2025 10:12 )  PTT:26.2 sec  Urinalysis Basic - ( 26 Jun 2025 15:01 )    Color: x / Appearance: x / SG: x / pH: x  Gluc: 124 mg/dL / Ketone: x  / Bili: x / Urobili: x   Blood: x / Protein: x / Nitrite: x   Leuk Esterase: x / RBC: x / WBC x   Sq Epi: x / Non Sq Epi: x / Bacteria: x        Imaging:      Assessment: S/P Right Knee Replacement . HTN. . Afib.       Plan:  PT/OT. Pain control. DVT prophylaxis. Incentive spirometry . Fall precaution. On Xarelto. Monitor BP .

## 2025-06-26 NOTE — OCCUPATIONAL THERAPY INITIAL EVALUATION ADULT - GENERAL OBSERVATIONS, REHAB EVAL
Pt encountered semi supine in bed, NAD, all lines intact, pt reported no pain s/p Right TKA, pt agreeable to OT eval, PT Luis present.

## 2025-06-26 NOTE — PATIENT PROFILE ADULT - NSPROPTRIGHTCAREGIVER_GEN_A_NUR
--DO NOT REPLY - Sent from PACT - If sent to wrong pool, reroute to P ECO Reroute pool --    General Message: Patient is calling to follow up on message left below. They have not heard back from the office. She is following up the the medication refills she requested. Please reach out.   Callback #: [phone]  Can a detailed message be left? Yes - Voicemail   Caller has been advised this message will be addressed within:1 business day [high priority]         yes

## 2025-06-26 NOTE — ASU PATIENT PROFILE, ADULT - PATIENT REPRESENTATIVE PHONE
--supportive care  --PT/OT consulted    S/p laminectomy- complicated by wound dehiscence- see above    S/p revision and washout today     [phone]

## 2025-06-26 NOTE — DISCHARGE NOTE PROVIDER - NSDCFUADDINST_GEN_ALL_CORE_FT
1. Pain Control PRN, pain medications were sent to your pharmacy, please pick them up on your way home  2. Walking with full weight bearing as tolerated, with assistive devices (walker/Cane as Needed)  3. DVT Prophylaxis, start Xarelto 10mg for first 7 days following surgery, then resume Xarelto 20mg daily starting postop day 8. Do NOT skip doses.  4. PT as needed  5. Please call the office and make an appointment for suture/staple removal on post operative day 21. Please call office for appointment ([phone])  6. Remove Dressing Post-Op Day 10-14, with Dry dressing and Daily Dressing Changes as Need for saturation / strike through. Please keep clean dry and intact; If prevena in place, please remove day 5-7, replace with aquacel dressing  7. Ice/Elevate affected area as Needed  8. Keep Dressing Clean and dry.  9. OK to shower w/ aquacel, DO NOT Submerge. No direct water contact. OK to wrap with Siran wrap for added protection

## 2025-06-26 NOTE — DISCHARGE NOTE PROVIDER - NSDCMRMEDTOKEN_GEN_ALL_CORE_FT
amLODIPine 5 mg oral tablet: 1 tab(s) orally once a day  atorvastatin 20 mg oral tablet: 1 tab(s) orally once a day  CeleBREX 200 mg oral capsule: 1 cap(s) orally once a day  Colace 100 mg oral capsule: 1 cap(s) orally every 8 hours as needed for  constipation  Metoprolol Succinate  mg oral tablet, extended release: 1 tab(s) orally once a day  ondansetron 4 mg oral tablet: 1 tab(s) orally every 8 hours as needed for  nausea  oxyCODONE 5 mg oral tablet: 1 tab(s) orally every 4 hours as needed for  severe pain Take ONLY as prescribed for severe pain as needed MDD: 6  traZODone 50 mg oral tablet: orally  valsartan-hydrochlorothiazide 320 mg-25 mg oral tablet: 1 tab(s) orally once a day  Xarelto 20 mg oral tablet: 1 tab(s) orally once a day   amLODIPine 5 mg oral tablet: 1 tab(s) orally once a day  atorvastatin 20 mg oral tablet: 1 tab(s) orally once a day  CeleBREX 200 mg oral capsule: 1 cap(s) orally once a day  Colace 100 mg oral capsule: 1 cap(s) orally every 8 hours as needed for  constipation  Metoprolol Succinate  mg oral tablet, extended release: 1 tab(s) orally once a day  ondansetron 4 mg oral tablet: 1 tab(s) orally every 8 hours as needed for  nausea  oxyCODONE 5 mg oral tablet: 1 tab(s) orally every 4 hours as needed for  severe pain Take ONLY as prescribed for severe pain as needed MDD: 6  traZODone 50 mg oral tablet: orally  valsartan-hydrochlorothiazide 320 mg-25 mg oral tablet: 1 tab(s) orally once a day  Xarelto 10 mg oral tablet: 1 tab(s) orally once a day take for 7 days total including your time in the hospital, then starting Xarelto 20mg after 7 days  Xarelto 20 mg oral tablet: 1 tab(s) orally once a day to start full dose after 7 days

## 2025-06-26 NOTE — DISCHARGE NOTE PROVIDER - NSDCFUSCHEDAPPT_GEN_ALL_CORE_FT
Mekhi Sanchez [facility]  ORTHOSURG [address]  Scheduled Appointment: 06/26/2025    Mekhi Sanchez [facility]  ORTHOSURANDREEA [address]  Scheduled Appointment: 07/18/2025     Mekhi Sanchez  [facility]  ORTHOSUR [address]  Scheduled Appointment: 07/18/2025

## 2025-06-26 NOTE — DISCHARGE NOTE PROVIDER - CARE PROVIDER_API CALL
Mekhi Sanchez.  Orthopaedic Surgery  [address], [address]  [place] [address]  Phone: [phone]  Fax: [phone]  Follow Up Time:

## 2025-06-26 NOTE — PHYSICAL THERAPY INITIAL EVALUATION ADULT - GENERAL OBSERVATIONS, REHAB EVAL
Chart reviewed, pt encountered on supine, AxOx4, cooperative, + dressing on R knee. OT Dairn present

## 2025-06-26 NOTE — DISCHARGE NOTE PROVIDER - HOSPITAL COURSE
Hospital Course:  The patient is a 69y Female status post elective Total Knee Arthroplasty to the R knee after failing outpatient nonoperative conservative management. Patient presented to [facility] after being medically cleared for an elective surgical procedure. The patient was taken to the operating room on date mentioned above. Prophylactic antibiotics were started before the procedure and continued for 24 hours. There were no complications during the procedure and patient tolerated the procedure well. The patient was transferred to the recovery room in stable condition and subsequently to the surgical floor. The patient was placed on Eliquis 2.5mg BID for anticoagulation while in house to continue until POD 14, then starting POD 15 patient instructed to start ASA 81mg PO BID for a total of 30 days. All home medications were continued. The patient received physical therapy daily and daily labs were followed. The dressing was kept clean, dry, intact. The rest of the hospital stay was unremarkable. Hospital Course:  The patient is a 69y Female status post elective Total Knee Arthroplasty to the R knee after failing outpatient nonoperative conservative management. Patient presented to [facility] after being medically cleared for an elective surgical procedure. The patient was taken to the operating room on date mentioned above. Prophylactic antibiotics were started before the procedure and continued for 24 hours. There were no complications during the procedure and patient tolerated the procedure well. The patient was transferred to the recovery room in stable condition and subsequently to the surgical floor. The patient was placed on Xarelto 10 mg daily for anticoagulation while in house to continue until POD7, then starting POD8 patient instructed to resume home dose of Xarelto 20 mg daily. All home medications were continued. The patient received physical therapy daily and daily labs were followed. The dressing was kept clean, dry, intact. The rest of the hospital stay was unremarkable.

## 2025-06-26 NOTE — ASU PATIENT PROFILE, ADULT - PAIN LOCATION, PROFILE
If you are a smoker, it is important for your health to stop smoking. Please be aware that second hand smoke is also harmful. right knee

## 2025-06-26 NOTE — PHYSICAL THERAPY INITIAL EVALUATION ADULT - TRANSFER TRAINING, PT EVAL
LABS:      CBC Full  -  ( 19 Jun 2019 19:29 )  WBC Count : 7.71 K/uL  RBC Count : 4.37 M/uL  Hemoglobin : 11.2 g/dL  Hematocrit : 38.5 %  Platelet Count - Automated : 354 K/uL  Mean Cell Volume : 88.1 fl  Mean Cell Hemoglobin : 25.6 pg  Mean Cell Hemoglobin Concentration : 29.1 gm/dL  Auto Neutrophil # : 5.08 K/uL  Auto Lymphocyte # : 1.55 K/uL  Auto Monocyte # : 0.70 K/uL  Auto Eosinophil # : 0.32 K/uL  Auto Basophil # : 0.04 K/uL  Auto Neutrophil % : 65.8 %  Auto Lymphocyte % : 20.1 %  Auto Monocyte % : 9.1 %  Auto Eosinophil % : 4.2 %  Auto Basophil % : 0.5 %    06-19    137  |  101  |  40<H>  ----------------------------<  154<H>  5.8<H>   |  29  |  3.79<H>    Ca    8.7      19 Jun 2019 19:29    TPro  7.9  /  Alb  3.3<L>  /  TBili  0.4  /  DBili  x   /  AST  53<H>  /  ALT  15  /  AlkPhos  194<H>  06-19    PT/INR - ( 19 Jun 2019 19:29 )   PT: 11.1 sec;   INR: 1.00 ratio       PTT - ( 19 Jun 2019 19:29 )  PTT:34.3 sec    RADIOLOGY & ADDITIONAL STUDIES (The following images were personally reviewed):    CXR: right chest permacath leading into right atrium, no cardio/pulmonary abnormality noted
To be able to perform transfers Independently, including bed to chair, chair to bed and chair to chair In order to facilitate safety with appropriate technique in 2 weeks

## 2025-06-26 NOTE — PATIENT PROFILE ADULT - WHEN WAS YOUR LAST VACCINATION? YEAR
No forms received for [facility].    Patient in office today to complete [employer] forms. 2024

## 2025-06-26 NOTE — ASU PREOP CHECKLIST - NSBLOODTRANS_GEN_A_CORE_SIUH
Sugar is mildly elevated.  Kidney & liver function and chemistries are all normal. Blood cell counts are normal. Thyroid function is normal. Cholesterol profile is OK. Screening test for presence of Hepatitis C is negative.  Urinalysis is normal. Urine test for abnormal amounts of protein in the urine is negative.  Minor variations from normal on some of your results are not worrisome.       If you receive a survey regarding your recent office visit, and if you feel that you received good service, then please fill out the survey giving us the highest marks possible. This is of great importance to our office.  Thank you for allowing us to be of service to you.          no...

## 2025-06-27 ENCOUNTER — TRANSCRIPTION ENCOUNTER (OUTPATIENT)
Age: 70
End: 2025-06-27

## 2025-06-27 VITALS
TEMPERATURE: 98 F | HEART RATE: 51 BPM | RESPIRATION RATE: 18 BRPM | DIASTOLIC BLOOD PRESSURE: 61 MMHG | OXYGEN SATURATION: 98 % | SYSTOLIC BLOOD PRESSURE: 105 MMHG

## 2025-06-27 LAB
ANION GAP SERPL CALC-SCNC: 4 MMOL/L — LOW (ref 5–17)
BUN SERPL-MCNC: 15 MG/DL — SIGNIFICANT CHANGE UP (ref 7–23)
CALCIUM SERPL-MCNC: 8.5 MG/DL — SIGNIFICANT CHANGE UP (ref 8.5–10.1)
CHLORIDE SERPL-SCNC: 110 MMOL/L — HIGH (ref 96–108)
CO2 SERPL-SCNC: 25 MMOL/L — SIGNIFICANT CHANGE UP (ref 22–31)
CREAT SERPL-MCNC: 0.68 MG/DL — SIGNIFICANT CHANGE UP (ref 0.5–1.3)
EGFR: 95 ML/MIN/1.73M2 — SIGNIFICANT CHANGE UP
EGFR: 95 ML/MIN/1.73M2 — SIGNIFICANT CHANGE UP
GLUCOSE SERPL-MCNC: 127 MG/DL — HIGH (ref 70–99)
HCT VFR BLD CALC: 32.8 % — LOW (ref 34.5–45)
HGB BLD-MCNC: 10.3 G/DL — LOW (ref 11.5–15.5)
MCHC RBC-ENTMCNC: 28.7 PG — SIGNIFICANT CHANGE UP (ref 27–34)
MCHC RBC-ENTMCNC: 31.4 G/DL — LOW (ref 32–36)
MCV RBC AUTO: 91.4 FL — SIGNIFICANT CHANGE UP (ref 80–100)
NRBC BLD AUTO-RTO: 0 /100 WBCS — SIGNIFICANT CHANGE UP (ref 0–0)
PLATELET # BLD AUTO: 180 K/UL — SIGNIFICANT CHANGE UP (ref 150–400)
POTASSIUM SERPL-MCNC: 3.3 MMOL/L — LOW (ref 3.5–5.3)
POTASSIUM SERPL-SCNC: 3.3 MMOL/L — LOW (ref 3.5–5.3)
RBC # BLD: 3.59 M/UL — LOW (ref 3.8–5.2)
RBC # FLD: 13.2 % — SIGNIFICANT CHANGE UP (ref 10.3–14.5)
SODIUM SERPL-SCNC: 139 MMOL/L — SIGNIFICANT CHANGE UP (ref 135–145)
WBC # BLD: 9.38 K/UL — SIGNIFICANT CHANGE UP (ref 3.8–10.5)
WBC # FLD AUTO: 9.38 K/UL — SIGNIFICANT CHANGE UP (ref 3.8–10.5)

## 2025-06-27 RX ORDER — TRAMADOL HYDROCHLORIDE 50 MG/1
1 TABLET, FILM COATED ORAL
Qty: 20 | Refills: 0
Start: 2025-06-27 | End: 2025-07-01

## 2025-06-27 RX ADMIN — Medication 1000 MILLIGRAM(S): at 13:32

## 2025-06-27 RX ADMIN — Medication 20 MILLIEQUIVALENT(S): at 11:38

## 2025-06-27 RX ADMIN — METOPROLOL SUCCINATE 100 MILLIGRAM(S): 50 TABLET, EXTENDED RELEASE ORAL at 05:08

## 2025-06-27 RX ADMIN — Medication 20 MILLIEQUIVALENT(S): at 09:31

## 2025-06-27 RX ADMIN — Medication 1000 MILLIGRAM(S): at 14:00

## 2025-06-27 RX ADMIN — CELECOXIB 200 MILLIGRAM(S): 50 CAPSULE ORAL at 06:08

## 2025-06-27 RX ADMIN — ATORVASTATIN CALCIUM 20 MILLIGRAM(S): 80 TABLET, FILM COATED ORAL at 11:40

## 2025-06-27 RX ADMIN — Medication 1 TABLET(S): at 11:42

## 2025-06-27 RX ADMIN — Medication 40 MILLIGRAM(S): at 05:08

## 2025-06-27 RX ADMIN — FOLIC ACID 1 MILLIGRAM(S): 1 TABLET ORAL at 11:39

## 2025-06-27 RX ADMIN — OXYCODONE HYDROCHLORIDE 5 MILLIGRAM(S): 30 TABLET ORAL at 15:04

## 2025-06-27 RX ADMIN — TRAMADOL HYDROCHLORIDE 50 MILLIGRAM(S): 50 TABLET, FILM COATED ORAL at 12:23

## 2025-06-27 RX ADMIN — OXYCODONE HYDROCHLORIDE 5 MILLIGRAM(S): 30 TABLET ORAL at 16:00

## 2025-06-27 RX ADMIN — TRAMADOL HYDROCHLORIDE 50 MILLIGRAM(S): 50 TABLET, FILM COATED ORAL at 13:20

## 2025-06-27 RX ADMIN — TRAMADOL HYDROCHLORIDE 50 MILLIGRAM(S): 50 TABLET, FILM COATED ORAL at 03:26

## 2025-06-27 RX ADMIN — CELECOXIB 200 MILLIGRAM(S): 50 CAPSULE ORAL at 05:08

## 2025-06-27 RX ADMIN — SODIUM CHLORIDE 125 MILLILITER(S): 9 INJECTION, SOLUTION INTRAVENOUS at 02:24

## 2025-06-27 RX ADMIN — TRAMADOL HYDROCHLORIDE 50 MILLIGRAM(S): 50 TABLET, FILM COATED ORAL at 02:26

## 2025-06-27 RX ADMIN — RIVAROXABAN 10 MILLIGRAM(S): 10 TABLET, FILM COATED ORAL at 11:39

## 2025-06-27 RX ADMIN — Medication 100 MILLIGRAM(S): at 04:10

## 2025-06-27 RX ADMIN — Medication 1000 MILLIGRAM(S): at 06:08

## 2025-06-27 RX ADMIN — Medication 400 MILLIGRAM(S): at 05:08

## 2025-06-27 NOTE — DISCHARGE NOTE NURSING/CASE MANAGEMENT/SOCIAL WORK - FINANCIAL ASSISTANCE
[facility] provides services at a reduced cost to those who are determined to be eligible through [facility]’s financial assistance program. Information regarding [facility]’s financial assistance program can be found by going to [email or web address] or by calling [phone].

## 2025-06-27 NOTE — DISCHARGE NOTE NURSING/CASE MANAGEMENT/SOCIAL WORK - NSDCPEFALRISK_GEN_ALL_CORE
For information on Fall & Injury Prevention, visit: [email or web address] OR  [email or web address] OR  https://www.cdc.gov/steadi/patient.html

## 2025-06-27 NOTE — DISCHARGE NOTE NURSING/CASE MANAGEMENT/SOCIAL WORK - PATIENT PORTAL LINK FT
You can access the FollowMyHealth Patient Portal offered by [facility] by registering at the following website: [email or web address]. By joining [employer]’s FollowMyHealth portal, you will also be able to view your health information using other applications (apps) compatible with our system.

## 2025-06-27 NOTE — DISCHARGE NOTE NURSING/CASE MANAGEMENT/SOCIAL WORK - NSDPDISTO_GEN_ALL_CORE
Body Location Override (Optional - Billing Will Still Be Based On Selected Body Map Location If Applicable): right upper back Add 67443 Cpt? (Important Note: In 2017 The Use Of 79535 Is Being Tracked By Cms To Determine Future Global Period Reimbursement For Global Periods): yes Detail Level: Detailed Home with home care

## 2025-06-27 NOTE — PROGRESS NOTE ADULT - SUBJECTIVE AND OBJECTIVE BOX
Patient seen and examined at bedside. Patient reports pain well controlled on medications. No acute events overnight. Pt denies fevers, chills, new onset numbness, weakness or tingling in the extremities.    Vital Signs (24 Hrs):  T(C): 36.7 (06-27-25 @ 04:27), Max: 37.1 (06-26-25 @ 20:27)  HR: 71 (06-27-25 @ 04:27) (59 - 87)  BP: 108/72 (06-27-25 @ 05:00) (80/53 - 126/89)  RR: 18 (06-27-25 @ 04:27) (11 - 22)  SpO2: 99% (06-27-25 @ 04:27) (95% - 100%)  Wt(kg): --    LABS:                          11.6   6.51  )-----------( 199      ( 26 Jun 2025 15:01 )             36.8     06-26    145  |  113[H]  |  16  ----------------------------<  124[H]  3.3[L]   |  26  |  0.79    Ca    9.0      26 Jun 2025 15:01        PT/INR - ( 26 Jun 2025 10:12 )   PT: 12.1 sec;   INR: 1.07 ratio         PTT - ( 26 Jun 2025 10:12 )  PTT:26.2 sec    Physical Exam:  General: NAD, resting comfortably in bed  RLE:   ACE bandage in place C/D/I  SCD in place bilaterally  No calf tenderness   Motor: + EHL/FHL/TA/GSC  +SILT: SPN/DPN/Jazz/Saph/Tib  +DP/PT       A/P:  69y f s/p R TKA with Dr Sanchez, doing well and stable at this time POD1    -PT/OT  -WBAT RLE  -Pain Control as needed, ice as tolerated  -DVT ppx: will resume Xarelto 10mg daily (POD1)  -Continue perioperative abx x 24 hours  -FU AM Labs  -Incentive Spirometry  -Medical management appreciated  -Dispo pending PT eval, expected discharge home   -Discussed above with Dr. Sanchez, who agrees with plan
Postop Check    Patient seen and examined at bedside in PACU, sp R TKA with Dr Sanchez. Patient tolerated the procedure well. No acute complaints at this time. Pain well controlled. Denies chest pain, shortness of breath, nausea or vomiting.     PE:  Vital Signs Last 24 Hrs  T(C): 36.9 (06-26-25 @ 10:09), Max: 36.9 (06-26-25 @ 10:09)  T(F): 98.5 (06-26-25 @ 10:09), Max: 98.5 (06-26-25 @ 10:09)  HR: 63 (06-26-25 @ 10:09) (63 - 63)  BP: 103/71 (06-26-25 @ 10:09) (103/71 - 103/71)  BP(mean): --  RR: 18 (06-26-25 @ 10:09) (18 - 18)  SpO2: 97% (06-26-25 @ 10:09) (97% - 97%)    General: NAD, resting comfortably in bed  RLE:   ACE bandage in place C/D/I  SCD in place bilaterally  No calf tenderness   Motor: + EHL/FHL/TA/GSC  +SILT: SPN/DPN/Jazz/Saph/Tib  +DP/PT       A/P:  69y f s/p R TKA with Dr Sanchez, doing well and stable at this time.     -PT/OT  -WBAT RLE  -Pain Control as needed, ice as tolerated  -DVT ppx: will resume Xarelto 10mg daily tomorrow (POD1)  -Continue perioperative abx x 24 hours  -FU AM Labs  -Incentive Spirometry  -Medical management appreciated  -Dispo pending PT eval, expected discharge home tomorrow  -Discussed above with Dr. Sanchez, who agrees with plan

## 2025-07-01 DIAGNOSIS — Z79.01 LONG TERM (CURRENT) USE OF ANTICOAGULANTS: ICD-10-CM

## 2025-07-01 DIAGNOSIS — E78.5 HYPERLIPIDEMIA, UNSPECIFIED: ICD-10-CM

## 2025-07-01 DIAGNOSIS — G47.00 INSOMNIA, UNSPECIFIED: ICD-10-CM

## 2025-07-01 DIAGNOSIS — M17.11 UNILATERAL PRIMARY OSTEOARTHRITIS, RIGHT KNEE: ICD-10-CM

## 2025-07-01 DIAGNOSIS — Z88.0 ALLERGY STATUS TO PENICILLIN: ICD-10-CM

## 2025-07-01 DIAGNOSIS — Z96.652 PRESENCE OF LEFT ARTIFICIAL KNEE JOINT: ICD-10-CM

## 2025-07-01 DIAGNOSIS — I10 ESSENTIAL (PRIMARY) HYPERTENSION: ICD-10-CM

## 2025-07-01 DIAGNOSIS — I48.91 UNSPECIFIED ATRIAL FIBRILLATION: ICD-10-CM

## 2025-07-14 ENCOUNTER — NON-APPOINTMENT (OUTPATIENT)
Age: 70
End: 2025-07-14

## 2025-07-18 ENCOUNTER — APPOINTMENT (OUTPATIENT)
Dept: ORTHOPEDIC SURGERY | Facility: CLINIC | Age: 70
End: 2025-07-18
Payer: MEDICARE

## 2025-07-18 PROBLEM — G47.00 INSOMNIA, UNSPECIFIED: Chronic | Status: ACTIVE | Noted: 2025-06-06

## 2025-07-18 PROCEDURE — 99024 POSTOP FOLLOW-UP VISIT: CPT

## 2025-07-18 RX ORDER — TRAMADOL HYDROCHLORIDE 50 MG/1
50 TABLET, COATED ORAL
Qty: 40 | Refills: 0 | Status: ACTIVE | COMMUNITY
Start: 2025-07-18 | End: 1900-01-01

## 2025-08-14 ENCOUNTER — NON-APPOINTMENT (OUTPATIENT)
Age: 70
End: 2025-08-14

## 2025-08-22 ENCOUNTER — APPOINTMENT (OUTPATIENT)
Dept: ORTHOPEDIC SURGERY | Facility: CLINIC | Age: 70
End: 2025-08-22
Payer: MEDICARE

## 2025-08-22 VITALS — BODY MASS INDEX: 33.27 KG/M2 | WEIGHT: 180.78 LBS | HEIGHT: 62 IN

## 2025-08-22 DIAGNOSIS — M17.11 UNILATERAL PRIMARY OSTEOARTHRITIS, RIGHT KNEE: ICD-10-CM

## 2025-08-22 DIAGNOSIS — Z96.651 PRESENCE OF RIGHT ARTIFICIAL KNEE JOINT: ICD-10-CM

## 2025-08-22 PROCEDURE — 73562 X-RAY EXAM OF KNEE 3: CPT | Mod: RT

## 2025-08-22 PROCEDURE — 99024 POSTOP FOLLOW-UP VISIT: CPT

## 2025-08-22 RX ORDER — CELECOXIB 200 MG/1
200 CAPSULE ORAL
Qty: 30 | Refills: 1 | Status: ACTIVE | COMMUNITY
Start: 2025-08-22 | End: 1900-01-01

## 2025-08-26 ENCOUNTER — APPOINTMENT (OUTPATIENT)
Dept: ORTHOPEDIC SURGERY | Facility: CLINIC | Age: 70
End: 2025-08-26

## 2025-08-26 VITALS — BODY MASS INDEX: 40.12 KG/M2 | WEIGHT: 218 LBS | HEIGHT: 62 IN

## 2025-08-26 DIAGNOSIS — M16.11 UNILATERAL PRIMARY OSTEOARTHRITIS, RIGHT HIP: ICD-10-CM

## 2025-09-16 ENCOUNTER — APPOINTMENT (OUTPATIENT)
Dept: ULTRASOUND IMAGING | Facility: CLINIC | Age: 70
End: 2025-09-16
Payer: MEDICARE

## 2025-09-16 PROCEDURE — 20611 DRAIN/INJ JOINT/BURSA W/US: CPT | Mod: RT

## (undated) DEVICE — DRSG 4 X 4" 4PLY STERILE

## (undated) DEVICE — SUT VICRYL 2-0 27" CT-2 UNDYED

## (undated) DEVICE — SYR LUER LOK 50CC

## (undated) DEVICE — FRA-ESU BOVIE FORCE TRIAD T6D04548DX: Type: DURABLE MEDICAL EQUIPMENT

## (undated) DEVICE — POSITIONER FOAM BUMP FLAT TOP 10X6X4" LRG

## (undated) DEVICE — DRSG ACE BANDAGE 6"

## (undated) DEVICE — SAW BLADE STRYKER SAGITTAL DUAL CUT 75X18X1.27MM

## (undated) DEVICE — HOOD FLYTE STRYKER HELMET SHIELD

## (undated) DEVICE — DRAPE EXTREMITY 87" X 128.5"

## (undated) DEVICE — CRYO/CUFF GRAVITY COOLER KNEE LARGE

## (undated) DEVICE — PACK TOTAL JOINT

## (undated) DEVICE — DRAPE STERI-DRAPE INCISE 19X17"

## (undated) DEVICE — SAW SAGITTAL 2108 SERIES 20.5X1.27X85MM

## (undated) DEVICE — GOWN XXL EXTRA LONG

## (undated) DEVICE — DRAPE LIMB BILATERAL

## (undated) DEVICE — DRSG WEBRIL 6"

## (undated) DEVICE — SUT VICRYL 0 18" CT-1 UNDYED (POP-OFF)

## (undated) DEVICE — DRSG AQUACEL 3.5 X 12"

## (undated) DEVICE — SUCTION YANKAUER NO CONTROL VENT

## (undated) DEVICE — ZIMMER BLADE PATELLA REAMER 35MM

## (undated) DEVICE — FRA-TOURNIQUET 402010120014: Type: DURABLE MEDICAL EQUIPMENT

## (undated) DEVICE — GLV 9 PROTEXIS (WHITE)

## (undated) DEVICE — DRAPE TOWEL BLUE 17" X 24"

## (undated) DEVICE — ELCTR STRYKER NEPTUNE SMOKE EVACUATION PENCIL (GREEN)

## (undated) DEVICE — MARKING PEN W RULER

## (undated) DEVICE — DRSG AQUACEL 3.5 X 14"

## (undated) DEVICE — BLADE SURGICAL #20 CARBON

## (undated) DEVICE — ZIMMER BLADE PATELLA REAMER W PILOT HOLE SZ 32

## (undated) DEVICE — ZIMMER PULSAVAC PLUS FAN KIT

## (undated) DEVICE — MEDICATION LABELS W MARKER

## (undated) DEVICE — DRAPE STERI-DRAPE INCISE 32X33"

## (undated) DEVICE — NDL HYPO SAFE 18G X 1.5" (PINK)

## (undated) DEVICE — DRAPE SHOWER CURTAIN ISOLATION

## (undated) DEVICE — SYR LUER LOK 10CC

## (undated) DEVICE — NDL HYPO SAFE 22G X 1.5" (BLACK)

## (undated) DEVICE — ZIMMER MIXING BOWL WITH SPATULA

## (undated) DEVICE — BLADE SURGICAL #15 CARBON

## (undated) DEVICE — DRAPE 3/4 SHEET W REINFORCEMENT 56X77"

## (undated) DEVICE — DRSG AQUACEL 3.5 X 10"

## (undated) DEVICE — HOOD T5 PEELAWAY

## (undated) DEVICE — DRAIN JACKSON PRATT 3 SPRING RESERVOIR W 10FR PVC DRAIN

## (undated) DEVICE — DRAPE INSTRUMENT POUCH 6.75" X 11"

## (undated) DEVICE — SUT VICRYL PLUS 0 18" CT-1 UNDYED (POP-OFF)

## (undated) DEVICE — PREP CHLORAPREP HI-LITE ORANGE 26ML

## (undated) DEVICE — DRAPE MAYO STAND 23"

## (undated) DEVICE — TOURNIQUET ESMARK 6"

## (undated) DEVICE — PACK BASIC

## (undated) DEVICE — PACK TOTAL KNEE

## (undated) DEVICE — GOWN XXL

## (undated) DEVICE — FRA-ESU BOVIE FORCE TRIAD T6D04558DX: Type: DURABLE MEDICAL EQUIPMENT

## (undated) DEVICE — SAW BLADE STRYKER SAGITTAL 81.5X12.5X1.27MM THICK

## (undated) DEVICE — GLV 9 PROTEXIS ORTHO (BROWN)

## (undated) DEVICE — STAPLER SKIN MULTI DIRECTION W35

## (undated) DEVICE — FRA-TOURNIQUET 402010120012: Type: DURABLE MEDICAL EQUIPMENT

## (undated) DEVICE — DRSG COMBINE 5 X 9"